# Patient Record
Sex: MALE | Race: WHITE | NOT HISPANIC OR LATINO | Employment: OTHER | URBAN - METROPOLITAN AREA
[De-identification: names, ages, dates, MRNs, and addresses within clinical notes are randomized per-mention and may not be internally consistent; named-entity substitution may affect disease eponyms.]

---

## 2020-07-21 ENCOUNTER — OFFICE VISIT (OUTPATIENT)
Dept: MEDICAL GROUP | Facility: PHYSICIAN GROUP | Age: 65
End: 2020-07-21

## 2020-07-21 VITALS
BODY MASS INDEX: 20.18 KG/M2 | HEART RATE: 77 BPM | OXYGEN SATURATION: 97 % | DIASTOLIC BLOOD PRESSURE: 88 MMHG | TEMPERATURE: 98.2 F | HEIGHT: 72 IN | WEIGHT: 149 LBS | SYSTOLIC BLOOD PRESSURE: 128 MMHG | RESPIRATION RATE: 14 BRPM

## 2020-07-21 DIAGNOSIS — Z12.5 SPECIAL SCREENING FOR MALIGNANT NEOPLASM OF PROSTATE: ICD-10-CM

## 2020-07-21 DIAGNOSIS — Z00.00 GENERAL MEDICAL EXAM: ICD-10-CM

## 2020-07-21 DIAGNOSIS — R56.9 SEIZURE (HCC): ICD-10-CM

## 2020-07-21 DIAGNOSIS — Z72.0 TOBACCO USE: ICD-10-CM

## 2020-07-21 PROCEDURE — 99203 OFFICE O/P NEW LOW 30 MIN: CPT | Performed by: PHYSICIAN ASSISTANT

## 2020-07-21 RX ORDER — PHENYTOIN SODIUM 100 MG/1
CAPSULE, EXTENDED RELEASE ORAL
COMMUNITY
Start: 2020-05-21 | End: 2020-10-20

## 2020-07-21 SDOH — HEALTH STABILITY: MENTAL HEALTH: HOW OFTEN DO YOU HAVE A DRINK CONTAINING ALCOHOL?: NEVER

## 2020-07-21 ASSESSMENT — PATIENT HEALTH QUESTIONNAIRE - PHQ9: CLINICAL INTERPRETATION OF PHQ2 SCORE: 0

## 2020-07-21 NOTE — ASSESSMENT & PLAN NOTE
Smoking 30 years, about 4 cigs per day, he's cutting down slowly. Going to go down to 2 cigs per day then quit. He's motivated to do this.

## 2020-07-21 NOTE — PROGRESS NOTES
CC:    Chief Complaint   Patient presents with   • Establish Care        HISTORY OF THE PRESENT ILLNESS: Patient is a 64 y.o. male. This pleasant patient is here today discuss seizure diagnosis    Health Maintenance: Completed  Colonoscopy due- never had one, will get after he gets medicare.   Dtap needs to be updated, but cash pay. Same with Shingrix.   Hep C screen- due in future, cash pay on labs.     Seizure (HCC)  Diagnosed 3 years ago, had seizure. Was being followed by [facility] neurology before. Neurologist left recently. Needs new Neurologist. Last saw her 6 months ago. Dilantin level has been checked in > 1 year. Needs to get records. Per patient he had MRI normal, EEG normal. Patient thinks his poor diet was the cause of his previous seizure. He was eating once a day, very poor diet. Now eating 3 meals a day feeling much better.     Tobacco use  Smoking 30 years, about 4 cigs per day, he's cutting down slowly. Going to go down to 2 cigs per day then quit. He's motivated to do this.     Allergies: Patient has no known allergies.    Current Outpatient Medications Ordered in Epic   Medication Sig Dispense Refill   • phenytoin ER (DILANTIN) 100 MG Cap Take  by mouth. Take 2 capsules by mouth two times a day       No current [facility]-ordered facility-administered medications on file.        Past Medical History:   Diagnosis Date   • Seizure (HCC)        Past Surgical History:   Procedure Laterality Date   • ELBOWPLASTY Right 1987       Social History     Tobacco Use   • Smoking status: Current Every Day Smoker     Packs/day: 0.25     Years: 30.00     Pack years: 7.50     Types: Cigarettes   • Smokeless tobacco: Never Used   • Tobacco comment: cutting back slowly.    Substance Use Topics   • Alcohol use: Not Currently     Frequency: Never   • Drug use: Not Currently       Social History     Social History Narrative   • Not on file       History reviewed. No pertinent family history.    ROS:   Constitutional: Patient  denies any fever, chills, night sweats, weight loss/gain, fatigue, or malaise.   Eyes: Patient denies any photophobia, eye pain, diplopia, discharge, dryness, excessive tearing, redness, or VA changes.   ENT: Patient denies any runny nose, hoarseness, sore throat, or dysphagia.   Resp: Patient denies cough, wheezing, or shortness of breath.   CV: Patient denies any chest pain, claudication, cyanosis, palpitations, or edema.   GI: Patient denies any constipation, nausea, vomiting, diarrhea, bloating, abdominal pain, or dyspepsia.   MSK: Patient denies any joint pain, cramps, swelling, weakness, stiffness, or tremor  Skin: Patient denies any color changes, skin changes, lesions, ulcerations, wounds, pruritus, hair or nail changes.   Neuro: Patient denies any headache, numbness or tingling  Psych: Patient denies any suicidal or homicidal ideation, depression or anxiety.       Exam: /88   Pulse 77   Temp 36.8 °C (98.2 °F) (Temporal)   Resp 14   Ht 1.829 m (6')   Wt 67.6 kg (149 lb)   SpO2 97%  Body mass index is 20.21 kg/m².    GENERAL: No acute distress, appropriately dressed. Well developed female.   HENT: Atraumatic, normocephalic, EAC's clear without impaction. TM's pearly gray and translucent.   EYES: Extraocular movements intact, pupils equal and reactive to light  NECK: Supple, FROM. No thyromegaly appreciated. No lymphadenopathy (submandibular, anterior/posterior cervical, and supraclavicular lymph nodes palpated) or masses. No bruits appreciated.   CHEST: No deformities, Equal chest expansion  HEART: Regular rate and rhythm, no murmur  RESP: Clear to auscultation bilaterally without wheezes, rales or rhonchi.   ABD: Soft, Nontender, Non-Distended  Extremities: No Clubbing, Cyanosis, or Edema  Skin: Warm/dry, no rashes.   Neuro: A/O x 4, Motor/sensory grossly intact (Due to COVID-19 did not have patient remove mask to perform cranial nerve check.)   Psych: Normal behavior, normal  affect      Assessment/Plan:  1. General medical exam  - DILANTIN; Future  - CBC WITH DIFFERENTIAL; Future  - Comp Metabolic Panel; Future  Patient cash pay for labs.  Instructed him to go to [facility] cash pay labs.  Her cash clinical.  Will need a Dilantin level has not been checked in over a year.  2. Seizure (HCC)  - REFERRAL TO NEUROLOGY  Check Dilantin level for patient.  Has not been checked over a year.  Referral to neurology.  May need to defer DMV form to neurology after further assessment.  Requesting records from [facility] neurology as well.  He has not had a seizure in 3 years.  Has been compliant with Dilantin in that timeframe.  3. Special screening for malignant neoplasm of prostate  Due for PSA level, patient is cash pay.  Patient would like to get this lab at a later date, when he has insurance. No family history of prostate cancer.  4. Tobacco use  Decreasing use.  Smoking 4 cigarettes a day.  He is going to cut back to 2 and then to none.  He is motivated to quit.  Other orders  - phenytoin ER (DILANTIN) 100 MG Cap; Take  by mouth. Take 2 capsules by mouth two times a day       Follow-up: Return in about 4 weeks (around 8/18/2020).    Please note that this dictation was created using voice recognition software. I have made every reasonable attempt to correct obvious errors, but I expect that there are errors of grammar and possibly content that I did not discover before finalizing the note.

## 2020-07-21 NOTE — ASSESSMENT & PLAN NOTE
Diagnosed 3 years ago, had seizure. Was being followed by [facility] neurology before. Neurologist left recently. Needs new Neurologist. Last saw her 6 months ago. Dilantin level has been checked in > 1 year. Needs to get records. Per patient he had MRI normal, EEG normal. Patient thinks his poor diet was the cause of his previous seizure. He was eating once a day, very poor diet. Now eating 3 meals a day feeling much better.

## 2020-07-27 ENCOUNTER — TELEPHONE (OUTPATIENT)
Dept: MEDICAL GROUP | Facility: PHYSICIAN GROUP | Age: 65
End: 2020-07-27

## 2020-07-27 NOTE — TELEPHONE ENCOUNTER
1st attempt: Left message on recorder to please call back about this MSG. [phone]    Please transfer to Yajaira for further discussion.

## 2020-07-29 NOTE — TELEPHONE ENCOUNTER
Pedro walked into clinic.    Pedro needs to have DMV paper work filled out by a Neurologist.    Neurologist referral has been placed.    DMV paper have been given back to Pedro.

## 2020-08-21 NOTE — PROGRESS NOTES
"Chief Complaint   Patient presents with   • New Patient     seizure       Problem List Items Addressed This Visit     Seizure (HCC)    Relevant Medications    levETIRAcetam (KEPPRA) 500 MG Tab    Other Relevant Orders    REFERRAL TO NEURODIAGNOSTICS (EEG,EP,EMG/NCS/DBS) Modality Requested: EEG (routine)      Other Visit Diagnoses     Vitamin D deficiency        Relevant Orders    VITAMIN D,25 HYDROXY          History of present illness:  Pedro Piper 65 y.o. male presents today for seizure evaluation. He was seeing Dr. Mcnulty before but she left [facility].     He reports he started having head bobbing and he passed out and fell off a chair in 2016. He was seen in the hospital and was told that he might have had a seizure. He f/u with Dr. Mcnulty and was started on Dilantin. A year later he had a similar spell but mild per pt. he describes this as having body tremors and passing out. He denies post-ictal confusion or disorientation. No tongue biting or incontinence. He believes that his spells are related to him not taking care of himself as he was eating \"crap\" and missing meals. He has been taking care of himself now and hasn't had any more spells. No auras or triggers. He is currently on Dilantin 100mg, 2 caps BID. No side effects. Compliant. He states he had EEG and MRI brain done. He was told they were normal. He had lab work done in July.    No family hx of epilepsy. No TBI hx.    Not drinking alcohol. Smokes 1/2 pack every 4 days. Occasional marijuana use.     Mood is good     Not taking vit D.     He is here to have his 's license renewed.         Past medical history:   Past Medical History:   Diagnosis Date   • Seizure (HCC)        Past surgical history:   Past Surgical History:   Procedure Laterality Date   • ELBOWPLASTY Right 1987       Family history:   History reviewed. No pertinent family history.    Social history:   Social History     Socioeconomic History   • Marital status: Single     " Spouse name: Not on file   • Number of children: Not on file   • Years of education: Not on file   • Highest education level: Not on file   Occupational History   • Not on file   Social Needs   • Financial resource strain: Not on file   • Food insecurity     Worry: Not on file     Inability: Not on file   • Transportation needs     Medical: Not on file     Non-medical: Not on file   Tobacco Use   • Smoking status: Current Every Day Smoker     Packs/day: 0.25     Years: 30.00     Pack years: 7.50     Types: Cigarettes   • Smokeless tobacco: Never Used   • Tobacco comment: cutting back slowly.    Substance and Sexual Activity   • Alcohol use: Not Currently     Frequency: Never   • Drug use: Not Currently   • Sexual activity: Not on file   Lifestyle   • Physical activity     Days per week: Not on file     Minutes per session: Not on file   • Stress: Not on file   Relationships   • Social connections     Talks on phone: Not on file     Gets together: Not on file     Attends Hinduism service: Not on file     Active member of club or organization: Not on file     Attends meetings of clubs or organizations: Not on file     Relationship status: Not on file   • Intimate partner violence     Fear of current or ex partner: Not on file     Emotionally abused: Not on file     Physically abused: Not on file     Forced sexual activity: Not on file   Other Topics Concern   • Not on file   Social History Narrative   • Not on file       Current medications:   Current Outpatient Medications   Medication   • phenytoin ER (DILANTIN) 100 MG Cap     No current facility-administered medications for this visit.        Medication Allergy:  No Known Allergies      Review of systems:     General: Denies fevers or chills, or nightsweats, or generalized fatigue.    Head: Denies headaches or dizziness or lightheadedness  EENT: Denies vision changes, vision loss or pain, nasal secretion, nasal bleeding, difficulty swallowing, hearing loss,  tinnitus, vertigo, ear pain  Respiratory: Denies shortness of breath, cough, sputum, or wheezing  Cardiac: Denies chest pain, palpitations, edema or syncope  Gastrointestinal: Denies nausea, vomiting, no abdominal pain or change in bowel habits, no melena or hematochezia  Urinary: Denies dysuria, frequency, hesitancy, or incontinence.  Dermatologic:  Denies new rash  Musculoskeletal: Denies muscle pain or swelling, no atrophy, no neck and back pain or stiffness.   Neurologic: Denies facial droopiness, muscle weakness (focal or generalized), paresthesias, ataxia, change in speech or language, memory loss, abnormal movements, seizures, loss of consciousness, or episodes of confusion.   Psychiatric: Denies anxiety, depression, mood swings, suicidal or homicidal thoughts       Physical examination:   Vitals:    08/28/20 0756   BP: 132/78   BP Location: Right arm   Patient Position: Sitting   BP Cuff Size: Adult   Pulse: 78   Resp: 18   Temp: 36.2 °C (97.2 °F)   TempSrc: Temporal   SpO2: 98%   Weight: 67.6 kg (149 lb 0.5 oz)   Height: 1.829 m (6')     General: Patient in no acute distress, pleasant and cooperative.  HEENT: Normocephalic, no signs of acute trauma.   moist conjunctivae. Nares are patent. Oropharynx clear without lesions and normal  hard and soft palates.   Neck: Supple. There is normal range of motion.   Resp: clear to auscultation bilaterally. No wheezes or crackles.   CV: RRR, no murmurs.   Abdomen: normoactive bowel sounds, soft, non distended or tender.   Skin: no signs of acute rashes or trauma.   Musculoskeletal: joints exhibit full range of motion, without any pain to palpation. There are no signs of joint or muscle swelling. There is no tenderness to deep palpation of muscles.   Psychiatric: No hallucinatory behavior. No symptoms of depression or suicidal ideation. Mood and affect appear normal on exam.     NEUROLOGICAL EXAM:   Mental status, orientation: Awake, alert and fully oriented.   Speech  and language: speech is clear and fluent. The patient is able to name, repeat and comprehend.   Memory: There is intact recollection of recent and remote events.   Cranial nerve exam:   CN I: Not examined   CN II: PERRL.   CN III, IV, VI: EOMI; no nystagmus   CN V: Facial sensation intact bilaterally   CN VII: face symmetric   CN VIII: hearing intact to finger rub bilaterally   CN IX, X: palate elevates symmetrically   CN XI: Symmetric shoulder shrug  CN XII: tongue midline. No signs of tongue biting or fasciculations   Motor exam: Strength is 5/5 in all extremities. Tone is normal. No abnormal movements were seen on exam.   Sensory exam reveals normal sense of light touch in all extremities.   Deep tendon reflexes:  2+ throughout. Plantar responses are flexor. There is no clonus.   Coordination: shows a normal finger-nose-finger. Normal rapidly alternating movements.   Gait: The patient was able to get up from seated position on first attempt without requiring assistance. Found to be steady when walking. Movements were fluid with normal arm swing. The patient was able to turn without difficulties or tendency to fall. Romberg exam unremarkable      ANCILLARY DATA REVIEWED:       Lab Data Review:  Reviewed in chart.     Records reviewed:   Reviewed in chart.    Imaging:   n/a    EEG:  n/a        ASSESSMENT AND PLAN:    1. Seizure (HCC)  - levETIRAcetam (KEPPRA) 500 MG Tab; Take 1 Tab by mouth 2 times a day.  Dispense: 60 Tab; Refill: 11  - REFERRAL TO NEURODIAGNOSTICS (EEG,EP,EMG/NCS/DBS) Modality Requested: EEG (routine)    2. Vitamin D deficiency  - VITAMIN D,25 HYDROXY; Future    3. Screening for depression    4. Encounter for examination for driving license    5. Tobacco use    6. 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 Pt had initial episode of head bobbing then passing out in 2016 then a year later, he had a similar spell. No post-ictal  confusion or disorientation. No tongue biting or incontinence. He was put on dilantin 100mg, 2 tabs BID by prior neurologist. No records to review. Will need to obtain results of EEG and MRI brain. He attributes his spells to bad diet as he was eating junk foods and not eating regularly then. No auras or triggers to his spells that he knows off.     No family hx of epilepsy. No TBI hx     No alcohol use.  Smokes cigarettes.  He uses marijuana occasionally    Mood is good. No suicidal or homicidal thoughts.  Past ASM's: none    Current ASM's: Dilantin (weaning off every month), Keppra 500mg BID.      Plan:  - Pt is self pay right now but will have insurance next year. He wants a head start of things and is willing to do routine EEG. Wants to do longer EEG next year.     -PT wants to continue to take ASM. Discussed side effects of chronic dilantin use including dental problems, balance issues related to cerebellar atrophy etc. Pt wants to switch this and he will think about challenging the diagnosis in a couple of years if his work-up is unremarkable. Will switch him to keppra. Aware of side effects including dizziness, drowsiness, fatigue, mood changes.  Verbal and written instructions given:    Dilantin: Take 1 cap in am and 2 caps at night for 1 month, then take 1 cap twice daily for 1 month, then take 1 cap at night for another month then discontinue.     Keppra: Start taking 1 tab twice a day.     -Will obtain records from [facility]. Will wait for MRI brain for now.     - Discussed avoidance of spell/sz triggers: alcohol, sleep deprivation, and stress.    - Discussed Vit D supplementation. Recommended taking 2000-5000u daily.    - Discussed driving restrictions. Okay to drive but aware to stop driving immediately if a spell occurs and report to us.  DMV paperwork given.     -Labs to be checked for next appointment: vit D            FOLLOW-UP:   Return in about 4 weeks (around 9/25/2020), or or after  EEG.      EDUCATION AND COUNSELING:  -Education was provided to the patient and/or family regarding diagnosis and prognosis. The chronic and unpredictable nature of the condition were discussed. There is increased risk for additional events, which may carry potential for significant injuries and death. Discussed frequent seizure triggers: sleep deprivation, medication non-compliance, use of illegal drugs/alcohol, stress, and others.   -We reviewed in detail the current antiepileptic regimen. Potential side effects of antiepileptics were discussed at length, including but no limited to: hypersensitivity reactions (rash and others, some of which can be fatal), visual field changes (some of which may be irreversible), glaucoma, diplopia, kidney stones, osteopenia/osteoporosis/bone fractures, hyperthermia/anhydrosis, hyponatremia, tremors/abnormal movements, ataxia, dizziness, fatigue, increased risk for falls, risk for cardiac arrhythmias/syncope, gastrointestinal side effects(hepatitis, pancreatitis, gastritis, ulcers), gingival hypertrophy/bleeding, drowsiness, sedation, anxiety/nervousness, increased risk for suicide, increased risk for depression, and psychosis.   -We also reviewed drug-drug interactions and their potential effect on seizure control and medication side effects.    -Recommend chronic vitamin D supplementation and regular exercise (if not contraindicated).   -Patient/family educated on risk for SUDEP (Sudden Death in Epilepsy). Counseling was provided on the importance of strict medication and follow up compliance. The patient/family understand the risks associated with non-adherence with the medical plan as outlined, including but not limited to an increased risk for breakthrough seizures, which may contribute to injuries, disability, status epilepticus, and even death.   -Counseling was also provided on potential effects of alcohol and other drugs, which may lower seizure threshold and/or affect  the metabolism of antiepileptic drugs. We recommend avoidance of alcohol and illegal drugs.  -Avoid sleep deprivation.   -We extensively discussed the aspects related to safety in drivers who suffer from epilepsy. The patient is encourage to report to the Division of Motor Vehicles of any condition and/or spells related to confusion, disorientation, and/or loss of awareness and/or loss of consciousness; as these may pose a safety issue if they occur while operating a motor vehicle. The patient and/or family are ultimately responsible for exercising caution and abiding to regulations in place.   -Other seizure precautions were discussed at length, including no diving, no skydiving, no climbing or exposure to unprotected heights, no unsupervised swimming, no Jacuzzi or bathing in bathtubs or deep bodies of water. The patient/family have been advised about risks for operating any machinery while suffering from seizures / syncope / epilepsy and/or while taking antiepileptic drugs.   -The patient understands and agrees that due to the complexity of his/her diagnosis, results of any testing and further recommendations will typically be discussed/made during a face to face encounter in my office. The patient and/or family further understands it is their responsibility to keep proper follow up.     Patient/family agree with plan, as outlined.         Latrice Dorantes, MSN, APRN, FNP-C  [facility]  Office: [phone]  Fax: [phone]

## 2020-08-28 ENCOUNTER — OFFICE VISIT (OUTPATIENT)
Dept: NEUROLOGY | Facility: MEDICAL CENTER | Age: 65
End: 2020-08-28

## 2020-08-28 VITALS
HEART RATE: 78 BPM | HEIGHT: 72 IN | RESPIRATION RATE: 18 BRPM | BODY MASS INDEX: 20.19 KG/M2 | SYSTOLIC BLOOD PRESSURE: 132 MMHG | OXYGEN SATURATION: 98 % | WEIGHT: 149.03 LBS | DIASTOLIC BLOOD PRESSURE: 78 MMHG | TEMPERATURE: 97.2 F

## 2020-08-28 DIAGNOSIS — E55.9 VITAMIN D DEFICIENCY: ICD-10-CM

## 2020-08-28 DIAGNOSIS — Z72.0 TOBACCO USE: ICD-10-CM

## 2020-08-28 DIAGNOSIS — R56.9 SEIZURE (HCC): ICD-10-CM

## 2020-08-28 DIAGNOSIS — Z71.6 ENCOUNTER FOR SMOKING CESSATION COUNSELING: ICD-10-CM

## 2020-08-28 DIAGNOSIS — Z02.4 ENCOUNTER FOR EXAMINATION FOR DRIVING LICENSE: ICD-10-CM

## 2020-08-28 DIAGNOSIS — Z13.31 SCREENING FOR DEPRESSION: ICD-10-CM

## 2020-08-28 PROCEDURE — 99205 OFFICE O/P NEW HI 60 MIN: CPT | Performed by: NURSE PRACTITIONER

## 2020-08-28 RX ORDER — LEVETIRACETAM 500 MG/1
500 TABLET ORAL 2 TIMES DAILY
Qty: 60 TAB | Refills: 11 | Status: SHIPPED | OUTPATIENT
Start: 2020-08-28 | End: 2021-08-06 | Stop reason: SDUPTHER

## 2020-08-28 RX ORDER — M-VIT,TX,IRON,MINS/CALC/FOLIC 27MG-0.4MG
1 TABLET ORAL DAILY
COMMUNITY

## 2020-08-28 NOTE — PATIENT INSTRUCTIONS
Dilantin: Take 1 cap in am and 2 caps at night for 1 month, then take 1 cap twice daily for 1 month, then take 1 cap at night for another month then discontinue.     Keppra: Start taking 1 tab twice a day.

## 2020-09-24 ENCOUNTER — TELEPHONE (OUTPATIENT)
Dept: NEUROLOGY | Facility: MEDICAL CENTER | Age: 65
End: 2020-09-24

## 2020-09-24 NOTE — TELEPHONE ENCOUNTER
Pt stated he took a 500 mg keppra last night was fine, this morning took another about 30 mins later the room was spinning, no balance. He is completely off the dilantin as of yesterday.    Per cathy I told pt he can stop taking the keppra and see how things go as far as balance issues but if he still feels dizzy, significant  balance issues he may need to go to the er. Pt verbally understood. Cathy would like to se pt asap to discuss meds. I will schedule pt.

## 2020-10-15 NOTE — PROGRESS NOTES
Chief Complaint   Patient presents with   • Follow-Up     Seizure (HCC)       Problem List Items Addressed This Visit     Seizure (HCC)    Tobacco use      Other Visit Diagnoses     Screening for depression        Encounter for smoking cessation counseling              Interim History:  Pedro Piper 65 y.o. male presents today for seizure f/u.     Pt had called last month c/o of side effects from the keppra but now he states he is doing well and he likes the keppra. He remains seizure free. He self discontinued the dilantin and now he thinks the side effects he was having was because he weaned himself off the dilantin too quickly. He denies any side effects on the keppra. Mood is good. No suicidal or homicidal thoughts. He forgot his lab work. He is taking vit D and is driving with no issues. He is weaning himself off smoking.          Past medical history:   Past Medical History:   Diagnosis Date   • Seizure (HCC)        Past surgical history:   Past Surgical History:   Procedure Laterality Date   • ELBOWPLASTY Right 1987       Family history:   History reviewed. No pertinent family history.    Social history:   Social History     Socioeconomic History   • Marital status: Single     Spouse name: Not on file   • Number of children: Not on file   • Years of education: Not on file   • Highest education level: Not on file   Occupational History   • Not on file   Social Needs   • Financial resource strain: Not on file   • Food insecurity     Worry: Not on file     Inability: Not on file   • Transportation needs     Medical: Not on file     Non-medical: Not on file   Tobacco Use   • Smoking status: Current Every Day Smoker     Packs/day: 0.25     Years: 30.00     Pack years: 7.50     Types: Cigarettes   • Smokeless tobacco: Never Used   • Tobacco comment: cutting back slowly.    Substance and Sexual Activity   • Alcohol use: Never     Frequency: Never   • Drug use: Yes     Types: Marijuana     Comment: occ   •  Sexual activity: Not on file   Lifestyle   • Physical activity     Days per week: Not on file     Minutes per session: Not on file   • Stress: Not on file   Relationships   • Social connections     Talks on phone: Not on file     Gets together: Not on file     Attends Nondenominational service: Not on file     Active member of club or organization: Not on file     Attends meetings of clubs or organizations: Not on file     Relationship status: Not on file   • Intimate partner violence     Fear of current or ex partner: Not on file     Emotionally abused: Not on file     Physically abused: Not on file     Forced sexual activity: Not on file   Other Topics Concern   • Not on file   Social History Narrative   • Not on file       Current medications:   Current Outpatient Medications   Medication   • therapeutic multivitamin-minerals (THERAGRAN-M) Tab   • levETIRAcetam (KEPPRA) 500 MG Tab   • phenytoin ER (DILANTIN) 100 MG Cap     No current facility-administered medications for this visit.        Medication Allergy:  No Known Allergies      Review of systems:     General: Denies fevers or chills, or nightsweats, or generalized fatigue.    Head: Denies headaches or dizziness or lightheadedness  EENT: Denies vision changes, vision loss or pain, nasal secretion, nasal bleeding, difficulty swallowing, hearing loss, tinnitus, vertigo, ear pain  Musculoskeletal: Denies muscle pain or swelling, no atrophy, no neck and back pain or stiffness.   Neurologic: Denies facial droopiness, muscle weakness (focal or generalized), paresthesias, ataxia, change in speech or language, memory loss, abnormal movements, seizures, loss of consciousness, or episodes of confusion.   Psychiatric: Denies anxiety, depression, mood swings, suicidal or homicidal thoughts       Physical examination:   Vitals:    10/20/20 1423   BP: 130/78   BP Location: Right arm   Patient Position: Sitting   BP Cuff Size: Adult   Pulse: 78   Resp: 18   Temp: 35.9 °C (96.7 °F)    TempSrc: Temporal   SpO2: 96%   Weight: 66.5 kg (146 lb 9.7 oz)   Height: 1.829 m (6')     General: Patient in no acute distress, pleasant and cooperative.  HEENT: Normocephalic, no signs of acute trauma.   Neck: Supple. There is normal range of motion.   Resp: clear to auscultation bilaterally. No wheezes or crackles.   CV: RRR, no murmurs.   Musculoskeletal: joints exhibit full range of motion  Psychiatric: No hallucinatory behavior. No symptoms of depression or suicidal ideation. Mood and affect appear normal on exam.     NEUROLOGICAL EXAM:   Mental status, orientation: Awake, alert and fully oriented.   Speech and language: speech is clear and fluent. The patient is able to name, repeat and comprehend.   Memory: There is intact recollection of recent and remote events.   Motor exam: Strength is 5/5 in all extremities. Tone is normal. No abnormal movements were seen on exam.   Sensory exam reveals normal sense of light touch in all extremities.   Gait: The patient was able to get up from seated position on first attempt without requiring assistance. Found to be steady when walking. Movements were fluid with normal arm swing. The patient was able to turn without difficulties or tendency to fall.       ANCILLARY DATA REVIEWED:       Lab Data Review:  Reviewed in chart. CBC, CMP    Records reviewed:   Reviewed in chart.    Imaging:   CT head 1/29/2018  Normal CT head w/o contrast    MRI brain w/o 11/26/2016  No acute intracranial pathology is seen to explain pt's symptoms  Mild chronic periventricular white matter microischemic changes  Tiny hemosiderin deposit in the left posterior temporal lobe    EEG:  n/a        ASSESSMENT AND PLAN:    1. Seizure (HCC)    2. Screening for depression    3. Tobacco use    4. 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 Pt had initial episode of head bobbing then passing out in 2016 then a year later, he had a  similar spell. No post-ictal confusion or disorientation. No tongue biting or incontinence. He was put on dilantin 100mg, 2 tabs BID by prior neurologist. No records to review. Will need to obtain results of EEG and MRI brain. He attributes his spells to bad diet as he was eating junk foods and not eating regularly then. No auras or triggers to his spells that he knows off. His CT head was unremarkable. His MRI brain w/o showed tiny hemosiderin deposit in the left posterior temporal lobe - no image to review. Pt is open to repeating imaging with contrast once he has insurance next year. He weaned himself off the dilantin too quickly but he is doing well on keppra now. No breakthrough seizure. No side effects. compliant     No family hx of epilepsy. No TBI hx      No alcohol use.  Smokes cigarettes.  He uses marijuana occasionally     Mood is good. No suicidal or homicidal thoughts.    Past ASM's: dilantin     Current ASM's: Keppra 500mg BID.        Plan:  - Pt is self pay right now but will have insurance next year. He wants a head start of things and is willing to do routine EEG. Wants to do longer EEG next year.      - Discussed avoidance of spell/sz triggers: alcohol, sleep deprivation, and stress.     - Discussed Vit D supplementation. Recommended taking 2000-5000u daily.     - Discussed driving restrictions. Okay to drive but aware to stop driving immediately if a spell occurs and report to us.     -Labs to be checked for next appointment: vit D -pt forgot this        FOLLOW-UP:   Return in about 6 months (around 4/20/2021).      EDUCATION AND COUNSELING:  -Education was provided to the patient and/or family regarding diagnosis and prognosis. The chronic and unpredictable nature of the condition were discussed. There is increased risk for additional events, which may carry potential for significant injuries and death. Discussed frequent seizure triggers: sleep deprivation, medication non-compliance, use of  illegal drugs/alcohol, stress, and others.   -We reviewed in detail the current antiepileptic regimen. Potential side effects of antiepileptics were discussed at length, including but no limited to: hypersensitivity reactions (rash and others, some of which can be fatal), visual field changes (some of which may be irreversible), glaucoma, diplopia, kidney stones, osteopenia/osteoporosis/bone fractures, hyperthermia/anhydrosis, hyponatremia, tremors/abnormal movements, ataxia, dizziness, fatigue, increased risk for falls, risk for cardiac arrhythmias/syncope, gastrointestinal side effects(hepatitis, pancreatitis, gastritis, ulcers), gingival hypertrophy/bleeding, drowsiness, sedation, anxiety/nervousness, increased risk for suicide, increased risk for depression, and psychosis.   -We also reviewed drug-drug interactions and their potential effect on seizure control and medication side effects.    -Recommend chronic vitamin D supplementation and regular exercise (if not contraindicated).   -Patient/family educated on risk for SUDEP (Sudden Death in Epilepsy). Counseling was provided on the importance of strict medication and follow up compliance. The patient/family understand the risks associated with non-adherence with the medical plan as outlined, including but not limited to an increased risk for breakthrough seizures, which may contribute to injuries, disability, status epilepticus, and even death.   -Counseling was also provided on potential effects of alcohol and other drugs, which may lower seizure threshold and/or affect the metabolism of antiepileptic drugs. We recommend avoidance of alcohol and illegal drugs.  -Avoid sleep deprivation.   -We extensively discussed the aspects related to safety in drivers who suffer from epilepsy. The patient is encourage to report to the Division of Motor Vehicles of any condition and/or spells related to confusion, disorientation, and/or loss of awareness and/or loss of  consciousness; as these may pose a safety issue if they occur while operating a motor vehicle. The patient and/or family are ultimately responsible for exercising caution and abiding to regulations in place.   -Other seizure precautions were discussed at length, including no diving, no skydiving, no climbing or exposure to unprotected heights, no unsupervised swimming, no Jacuzzi or bathing in bathtubs or deep bodies of water. The patient/family have been advised about risks for operating any machinery while suffering from seizures / syncope / epilepsy and/or while taking antiepileptic drugs.   -The patient understands and agrees that due to the complexity of his/her diagnosis, results of any testing and further recommendations will typically be discussed/made during a face to face encounter in my office. The patient and/or family further understands it is their responsibility to keep proper follow up.     Patient/family agree with plan, as outlined.         Latrice Dorantes, MSN, APRN, FNP-C  [facility]  Office: [phone]  Fax: [phone]

## 2020-10-20 ENCOUNTER — OFFICE VISIT (OUTPATIENT)
Dept: NEUROLOGY | Facility: MEDICAL CENTER | Age: 65
End: 2020-10-20

## 2020-10-20 VITALS
RESPIRATION RATE: 18 BRPM | HEIGHT: 72 IN | TEMPERATURE: 96.7 F | WEIGHT: 146.61 LBS | DIASTOLIC BLOOD PRESSURE: 78 MMHG | BODY MASS INDEX: 19.86 KG/M2 | HEART RATE: 78 BPM | OXYGEN SATURATION: 96 % | SYSTOLIC BLOOD PRESSURE: 130 MMHG

## 2020-10-20 DIAGNOSIS — R56.9 SEIZURE (HCC): ICD-10-CM

## 2020-10-20 DIAGNOSIS — Z72.0 TOBACCO USE: ICD-10-CM

## 2020-10-20 DIAGNOSIS — Z13.31 SCREENING FOR DEPRESSION: ICD-10-CM

## 2020-10-20 DIAGNOSIS — Z71.6 ENCOUNTER FOR SMOKING CESSATION COUNSELING: ICD-10-CM

## 2020-10-20 PROCEDURE — 99214 OFFICE O/P EST MOD 30 MIN: CPT | Performed by: NURSE PRACTITIONER

## 2020-12-09 ENCOUNTER — PRE-ADMISSION TESTING (OUTPATIENT)
Dept: ADMISSIONS | Facility: MEDICAL CENTER | Age: 65
End: 2020-12-09
Attending: ORTHOPAEDIC SURGERY

## 2020-12-09 DIAGNOSIS — Z01.812 PRE-OPERATIVE LABORATORY EXAMINATION: ICD-10-CM

## 2020-12-09 LAB
COVID ORDER STATUS COVID19: NORMAL
SARS-COV+SARS-COV-2 AG RESP QL IA.RAPID: DETECTED
SPECIMEN SOURCE: ABNORMAL

## 2020-12-09 PROCEDURE — 87426 SARSCOV CORONAVIRUS AG IA: CPT

## 2020-12-09 PROCEDURE — C9803 HOPD COVID-19 SPEC COLLECT: HCPCS

## 2020-12-09 NOTE — OR NURSING
Spoke with Sana from preadmit regarding the patient's positive covid result. Sana asked what to tell patient as far as checking in. At the same time patient called pre-op and I informed Sana I would speak with the patient and arrange check in.     Spoke with patient on the phone, instructed to check in at 1000. Given the number to pre-op and told to call when he arrives at ground floor in [facility]. Patient will then be brought immediately to the unit and taken to a room.    Will communicate with the OR regarding the patient's test results.

## 2020-12-09 NOTE — OR NURSING
Pt. With positive rapid covid test, notified Dr. Marquez's schedulers. Notified pre-op supervisor Sheridan who notified pt. And stated she would notify the OR. Called anesthesia and spoke to Carito

## 2020-12-10 ENCOUNTER — ANESTHESIA EVENT (OUTPATIENT)
Dept: SURGERY | Facility: MEDICAL CENTER | Age: 65
End: 2020-12-10

## 2020-12-10 ENCOUNTER — HOSPITAL ENCOUNTER (OUTPATIENT)
Facility: MEDICAL CENTER | Age: 65
End: 2020-12-10
Attending: ORTHOPAEDIC SURGERY | Admitting: ORTHOPAEDIC SURGERY

## 2020-12-10 ENCOUNTER — ANESTHESIA (OUTPATIENT)
Dept: SURGERY | Facility: MEDICAL CENTER | Age: 65
End: 2020-12-10

## 2020-12-10 ENCOUNTER — APPOINTMENT (OUTPATIENT)
Dept: RADIOLOGY | Facility: MEDICAL CENTER | Age: 65
End: 2020-12-10
Attending: ORTHOPAEDIC SURGERY

## 2020-12-10 VITALS
OXYGEN SATURATION: 95 % | TEMPERATURE: 96.9 F | HEIGHT: 72 IN | WEIGHT: 149.03 LBS | SYSTOLIC BLOOD PRESSURE: 160 MMHG | RESPIRATION RATE: 12 BRPM | DIASTOLIC BLOOD PRESSURE: 93 MMHG | HEART RATE: 87 BPM | BODY MASS INDEX: 20.19 KG/M2

## 2020-12-10 DIAGNOSIS — G89.18 POSTOPERATIVE PAIN: ICD-10-CM

## 2020-12-10 PROCEDURE — 700102 HCHG RX REV CODE 250 W/ 637 OVERRIDE(OP): Performed by: ANESTHESIOLOGY

## 2020-12-10 PROCEDURE — 160048 HCHG OR STATISTICAL LEVEL 1-5: Performed by: ORTHOPAEDIC SURGERY

## 2020-12-10 PROCEDURE — 160029 HCHG SURGERY MINUTES - 1ST 30 MINS LEVEL 4: Performed by: ORTHOPAEDIC SURGERY

## 2020-12-10 PROCEDURE — 160009 HCHG ANES TIME/MIN: Performed by: ORTHOPAEDIC SURGERY

## 2020-12-10 PROCEDURE — 160035 HCHG PACU - 1ST 60 MINS PHASE I: Performed by: ORTHOPAEDIC SURGERY

## 2020-12-10 PROCEDURE — 160002 HCHG RECOVERY MINUTES (STAT): Performed by: ORTHOPAEDIC SURGERY

## 2020-12-10 PROCEDURE — 502000 HCHG MISC OR IMPLANTS RC 0278: Performed by: ORTHOPAEDIC SURGERY

## 2020-12-10 PROCEDURE — 500562 HCHG FIBERWIRE: Performed by: ORTHOPAEDIC SURGERY

## 2020-12-10 PROCEDURE — 501838 HCHG SUTURE GENERAL: Performed by: ORTHOPAEDIC SURGERY

## 2020-12-10 PROCEDURE — 700111 HCHG RX REV CODE 636 W/ 250 OVERRIDE (IP): Performed by: ANESTHESIOLOGY

## 2020-12-10 PROCEDURE — 700101 HCHG RX REV CODE 250: Performed by: ANESTHESIOLOGY

## 2020-12-10 PROCEDURE — 73030 X-RAY EXAM OF SHOULDER: CPT | Mod: RT

## 2020-12-10 PROCEDURE — 160041 HCHG SURGERY MINUTES - EA ADDL 1 MIN LEVEL 4: Performed by: ORTHOPAEDIC SURGERY

## 2020-12-10 PROCEDURE — A9270 NON-COVERED ITEM OR SERVICE: HCPCS | Performed by: ORTHOPAEDIC SURGERY

## 2020-12-10 PROCEDURE — 500891 HCHG PACK, ORTHO MAJOR: Performed by: ORTHOPAEDIC SURGERY

## 2020-12-10 PROCEDURE — 700105 HCHG RX REV CODE 258: Performed by: ORTHOPAEDIC SURGERY

## 2020-12-10 PROCEDURE — 700101 HCHG RX REV CODE 250: Performed by: ORTHOPAEDIC SURGERY

## 2020-12-10 PROCEDURE — 160036 HCHG PACU - EA ADDL 30 MINS PHASE I: Performed by: ORTHOPAEDIC SURGERY

## 2020-12-10 PROCEDURE — A9270 NON-COVERED ITEM OR SERVICE: HCPCS | Performed by: ANESTHESIOLOGY

## 2020-12-10 DEVICE — IMPLANTABLE DEVICE: Type: IMPLANTABLE DEVICE | Site: SHOULDER | Status: FUNCTIONAL

## 2020-12-10 RX ORDER — OXYCODONE HCL 5 MG/5 ML
5 SOLUTION, ORAL ORAL
Status: COMPLETED | OUTPATIENT
Start: 2020-12-10 | End: 2020-12-10

## 2020-12-10 RX ORDER — CEFAZOLIN SODIUM 1 G/3ML
INJECTION, POWDER, FOR SOLUTION INTRAMUSCULAR; INTRAVENOUS PRN
Status: DISCONTINUED | OUTPATIENT
Start: 2020-12-10 | End: 2020-12-10 | Stop reason: SURG

## 2020-12-10 RX ORDER — CELECOXIB 200 MG/1
200 CAPSULE ORAL ONCE
Status: COMPLETED | OUTPATIENT
Start: 2020-12-10 | End: 2020-12-10

## 2020-12-10 RX ORDER — HYDROMORPHONE HYDROCHLORIDE 1 MG/ML
0.4 INJECTION, SOLUTION INTRAMUSCULAR; INTRAVENOUS; SUBCUTANEOUS
Status: DISCONTINUED | OUTPATIENT
Start: 2020-12-10 | End: 2020-12-11 | Stop reason: HOSPADM

## 2020-12-10 RX ORDER — HALOPERIDOL 5 MG/ML
1 INJECTION INTRAMUSCULAR
Status: DISCONTINUED | OUTPATIENT
Start: 2020-12-10 | End: 2020-12-11 | Stop reason: HOSPADM

## 2020-12-10 RX ORDER — SODIUM CHLORIDE, SODIUM LACTATE, POTASSIUM CHLORIDE, CALCIUM CHLORIDE 600; 310; 30; 20 MG/100ML; MG/100ML; MG/100ML; MG/100ML
INJECTION, SOLUTION INTRAVENOUS CONTINUOUS
Status: DISCONTINUED | OUTPATIENT
Start: 2020-12-10 | End: 2020-12-11 | Stop reason: HOSPADM

## 2020-12-10 RX ORDER — DEXAMETHASONE SODIUM PHOSPHATE 4 MG/ML
INJECTION, SOLUTION INTRA-ARTICULAR; INTRALESIONAL; INTRAMUSCULAR; INTRAVENOUS; SOFT TISSUE PRN
Status: DISCONTINUED | OUTPATIENT
Start: 2020-12-10 | End: 2020-12-10 | Stop reason: SURG

## 2020-12-10 RX ORDER — HYDROCODONE BITARTRATE AND ACETAMINOPHEN 7.5; 325 MG/1; MG/1
1 TABLET ORAL EVERY 6 HOURS PRN
Qty: 30 TAB | Refills: 0 | Status: SHIPPED | OUTPATIENT
Start: 2020-12-10 | End: 2020-12-24

## 2020-12-10 RX ORDER — ONDANSETRON 2 MG/ML
4 INJECTION INTRAMUSCULAR; INTRAVENOUS
Status: COMPLETED | OUTPATIENT
Start: 2020-12-10 | End: 2020-12-10

## 2020-12-10 RX ORDER — HYDROMORPHONE HYDROCHLORIDE 1 MG/ML
0.1 INJECTION, SOLUTION INTRAMUSCULAR; INTRAVENOUS; SUBCUTANEOUS
Status: DISCONTINUED | OUTPATIENT
Start: 2020-12-10 | End: 2020-12-11 | Stop reason: HOSPADM

## 2020-12-10 RX ORDER — MEPERIDINE HYDROCHLORIDE 25 MG/ML
12.5 INJECTION INTRAMUSCULAR; INTRAVENOUS; SUBCUTANEOUS
Status: DISCONTINUED | OUTPATIENT
Start: 2020-12-10 | End: 2020-12-11 | Stop reason: HOSPADM

## 2020-12-10 RX ORDER — OXYCODONE HCL 5 MG/5 ML
10 SOLUTION, ORAL ORAL
Status: COMPLETED | OUTPATIENT
Start: 2020-12-10 | End: 2020-12-10

## 2020-12-10 RX ORDER — ONDANSETRON 2 MG/ML
INJECTION INTRAMUSCULAR; INTRAVENOUS PRN
Status: DISCONTINUED | OUTPATIENT
Start: 2020-12-10 | End: 2020-12-10 | Stop reason: SURG

## 2020-12-10 RX ORDER — HYDROMORPHONE HYDROCHLORIDE 1 MG/ML
0.2 INJECTION, SOLUTION INTRAMUSCULAR; INTRAVENOUS; SUBCUTANEOUS
Status: DISCONTINUED | OUTPATIENT
Start: 2020-12-10 | End: 2020-12-11 | Stop reason: HOSPADM

## 2020-12-10 RX ORDER — MELOXICAM 7.5 MG/1
15 TABLET ORAL DAILY
Qty: 30 TAB | Refills: 1 | Status: SHIPPED | OUTPATIENT
Start: 2020-12-10 | End: 2021-11-05

## 2020-12-10 RX ORDER — ACETAMINOPHEN 500 MG
1000 TABLET ORAL ONCE
Status: COMPLETED | OUTPATIENT
Start: 2020-12-10 | End: 2020-12-10

## 2020-12-10 RX ORDER — ROCURONIUM BROMIDE 10 MG/ML
INJECTION, SOLUTION INTRAVENOUS PRN
Status: DISCONTINUED | OUTPATIENT
Start: 2020-12-10 | End: 2020-12-10 | Stop reason: SURG

## 2020-12-10 RX ORDER — HYDROMORPHONE HYDROCHLORIDE 2 MG/ML
INJECTION, SOLUTION INTRAMUSCULAR; INTRAVENOUS; SUBCUTANEOUS PRN
Status: DISCONTINUED | OUTPATIENT
Start: 2020-12-10 | End: 2020-12-10 | Stop reason: SURG

## 2020-12-10 RX ORDER — LIDOCAINE HYDROCHLORIDE 20 MG/ML
INJECTION, SOLUTION EPIDURAL; INFILTRATION; INTRACAUDAL; PERINEURAL PRN
Status: DISCONTINUED | OUTPATIENT
Start: 2020-12-10 | End: 2020-12-10 | Stop reason: SURG

## 2020-12-10 RX ADMIN — HYDROMORPHONE HYDROCHLORIDE 1 MG: 2 INJECTION, SOLUTION INTRAMUSCULAR; INTRAVENOUS; SUBCUTANEOUS at 16:22

## 2020-12-10 RX ADMIN — CELECOXIB 200 MG: 200 CAPSULE ORAL at 10:56

## 2020-12-10 RX ADMIN — CEFAZOLIN 2 G: 330 INJECTION, POWDER, FOR SOLUTION INTRAMUSCULAR; INTRAVENOUS at 13:57

## 2020-12-10 RX ADMIN — OXYCODONE HYDROCHLORIDE 10 MG: 5 SOLUTION ORAL at 16:59

## 2020-12-10 RX ADMIN — ROCURONIUM BROMIDE 10 MG: 10 INJECTION, SOLUTION INTRAVENOUS at 16:02

## 2020-12-10 RX ADMIN — SUGAMMADEX 200 MG: 100 INJECTION, SOLUTION INTRAVENOUS at 16:19

## 2020-12-10 RX ADMIN — FENTANYL CITRATE 50 MCG: 50 INJECTION, SOLUTION INTRAMUSCULAR; INTRAVENOUS at 17:43

## 2020-12-10 RX ADMIN — FENTANYL CITRATE 100 MCG: 50 INJECTION, SOLUTION INTRAMUSCULAR; INTRAVENOUS at 13:40

## 2020-12-10 RX ADMIN — ACETAMINOPHEN 1000 MG: 500 TABLET ORAL at 10:56

## 2020-12-10 RX ADMIN — SODIUM CHLORIDE, POTASSIUM CHLORIDE, SODIUM LACTATE AND CALCIUM CHLORIDE: 600; 310; 30; 20 INJECTION, SOLUTION INTRAVENOUS at 10:54

## 2020-12-10 RX ADMIN — ROCURONIUM BROMIDE 50 MG: 10 INJECTION, SOLUTION INTRAVENOUS at 13:40

## 2020-12-10 RX ADMIN — FENTANYL CITRATE 50 MCG: 50 INJECTION, SOLUTION INTRAMUSCULAR; INTRAVENOUS at 16:21

## 2020-12-10 RX ADMIN — LIDOCAINE HYDROCHLORIDE 0.5 ML: 10 INJECTION, SOLUTION EPIDURAL; INFILTRATION; INTRACAUDAL; PERINEURAL at 10:54

## 2020-12-10 RX ADMIN — POVIDONE IODINE 15 ML: 100 SOLUTION TOPICAL at 10:55

## 2020-12-10 RX ADMIN — ROCURONIUM BROMIDE 20 MG: 10 INJECTION, SOLUTION INTRAVENOUS at 14:48

## 2020-12-10 RX ADMIN — FENTANYL CITRATE 50 MCG: 50 INJECTION, SOLUTION INTRAMUSCULAR; INTRAVENOUS at 14:47

## 2020-12-10 RX ADMIN — ONDANSETRON 4 MG: 2 INJECTION INTRAMUSCULAR; INTRAVENOUS at 16:59

## 2020-12-10 RX ADMIN — FENTANYL CITRATE 50 MCG: 50 INJECTION, SOLUTION INTRAMUSCULAR; INTRAVENOUS at 17:24

## 2020-12-10 RX ADMIN — PROPOFOL 150 MG: 10 INJECTION, EMULSION INTRAVENOUS at 13:40

## 2020-12-10 RX ADMIN — DEXAMETHASONE SODIUM PHOSPHATE 8 MG: 4 INJECTION, SOLUTION INTRA-ARTICULAR; INTRALESIONAL; INTRAMUSCULAR; INTRAVENOUS; SOFT TISSUE at 13:46

## 2020-12-10 RX ADMIN — FENTANYL CITRATE 50 MCG: 50 INJECTION, SOLUTION INTRAMUSCULAR; INTRAVENOUS at 14:26

## 2020-12-10 RX ADMIN — ONDANSETRON 4 MG: 2 INJECTION INTRAMUSCULAR; INTRAVENOUS at 16:19

## 2020-12-10 RX ADMIN — LIDOCAINE HYDROCHLORIDE 60 MG: 20 INJECTION, SOLUTION EPIDURAL; INFILTRATION; INTRACAUDAL at 13:40

## 2020-12-10 ASSESSMENT — PAIN DESCRIPTION - PAIN TYPE
TYPE: SURGICAL PAIN

## 2020-12-10 ASSESSMENT — PAIN SCALES - GENERAL: PAIN_LEVEL: 0

## 2020-12-10 NOTE — ANESTHESIA PREPROCEDURE EVALUATION
Relevant Problems   NEURO   (+) Seizure (HCC)   right shoulder fracture/dislocation  Smoker  etoh  Covid + on rapid test prescreening, no sick contacts, no recent illnesses or symptoms, has been self isolating for over 2 weeks    Physical Exam    Airway   Mallampati: I  TM distance: >3 FB  Neck ROM: full       Cardiovascular - normal exam  Rhythm: regular  Rate: normal  (-) murmur     Dental   Comments: No loose, fairly poor         Pulmonary - normal exam  Breath sounds clear to auscultation     Abdominal    Neurological - normal exam               Anesthesia Plan    ASA 3 (seizure disorder)       Plan - general and peripheral nerve block     Peripheral nerve block will be post-op pain control  Airway plan will be ETT        Induction: intravenous    Postoperative Plan: Postoperative administration of opioids is intended.    Pertinent diagnostic labs and testing reviewed    Informed Consent:    Anesthetic plan and risks discussed with patient.    Use of blood products discussed with: patient whom consented to blood products.

## 2020-12-10 NOTE — H&P
Date of Service: 12/10/20    CC: Right shoulder dislocation    HPI: Pedro Piper is a 65 y.o. male who presents with complaints of pain to right shoulder.  This started a little over 2 weeks ago after he fell out of a bed onto his right side.  The pain is 4/10 and is described as sharp.  The pain is made worse by palpation of the area and made better by rest and immobilization.  He denies any other sources of pain.  Denies any numbness or tingling.  He has had swelling more distally near his hand but has no pain at that area.    PMH:   Past Medical History:   Diagnosis Date   • Seizure (HCC)        PSH:   Past Surgical History:   Procedure Laterality Date   • ELBOWPLASTY Right 1987       FH: History reviewed. No pertinent family history.    SH:   Social History     Socioeconomic History   • Marital status: Single     Spouse name: Not on file   • Number of children: Not on file   • Years of education: Not on file   • Highest education level: Not on file   Occupational History   • Not on file   Social Needs   • Financial resource strain: Not on file   • Food insecurity     Worry: Not on file     Inability: Not on file   • Transportation needs     Medical: Not on file     Non-medical: Not on file   Tobacco Use   • Smoking status: Current Every Day Smoker     Packs/day: 0.25     Years: 30.00     Pack years: 7.50     Types: Cigarettes   • Smokeless tobacco: Never Used   • Tobacco comment: cutting back slowly.    Substance and Sexual Activity   • Alcohol use: Never     Frequency: Never   • Drug use: Yes     Types: Marijuana, Inhaled     Comment: marijuana- last use 12/6/20   • Sexual activity: Not on file   Lifestyle   • Physical activity     Days per week: Not on file     Minutes per session: Not on file   • Stress: Not on file   Relationships   • Social connections     Talks on phone: Not on file     Gets together: Not on file     Attends Christian service: Not on file     Active member of club or organization:  Not on file     Attends meetings of clubs or organizations: Not on file     Relationship status: Not on file   • Intimate partner violence     Fear of current or ex partner: Not on file     Emotionally abused: Not on file     Physically abused: Not on file     Forced sexual activity: Not on file   Other Topics Concern   • Not on file   Social History Narrative   • Not on file       ROS: A 10 system review of systems was negative outside what was listed in the HPI    /103   Pulse 90   Temp 36.1 °C (96.9 °F) (Temporal)   Resp 16   Ht 1.829 m (6')   Wt 67.6 kg (149 lb 0.5 oz)   SpO2 97%     Physical Exam:  General: AAOx3, NAD  HEENT: normocephalic, atraumatic  Psych: Normal mood and affect  Neck: supple, no pain to motion  Chest/Pulmonary: breathing unlabored, no audible wheezing  Cardio: regular heart rate and rhythm  Neuro: sensation grossly intact to BUE and BLE, moving all four extremities  Skin: intact with no full thickness abrasions or lacerations  MSK: Right shoulder with anterior prominence.  No tenderness of the elbow forearm or wrist.  He has normal sensation in median radial ulnar and axillary nerve distributions.  Normal motor function of the hand and wrist.  Swelling to the forearm through the hand but no point tenderness.  Left upper extremity bilateral lower extremities are atraumatic and nontender to palpation range of motion.    Imaging and labs: X-rays and the CT scan of the right shoulder from outside facility show a greater tuberosity fracture and anterior glenohumeral dislocation    Assessment: Right shoulder dislocation with greater tuberosity fracture    We discussed the diagnosis and findings with the patient at length.  We reviewed possible non operative and operative interventions and the risks and benefits of these.  Recommend initial attempt at closed reduction and assess his stability.  If he remains unstable of the greater tuberosity fracture is poorly aligned he may need open  reduction internal fixation.  He had a chance to ask questions and all of these were answered to his satisfaction.        Plan:  N.p.o.  OR for closed versus open reduction of right shoulder  Covid precautions  Discharged home following procedure  Clinic follow-up  Sling for comfort

## 2020-12-10 NOTE — ANESTHESIA PROCEDURE NOTES
Airway    Date/Time: 12/10/2020 1:41 PM  Performed by: Sigifredo Burgos M.D.  Authorized by: Sigifredo Burgos M.D.     Location:  OR  Urgency:  Elective  Indications for Airway Management:  Anesthesia  Additional Indication: COVID-19 Active or PUI  COVID-19 Comments: I performed this patient's endotracheal intubation which required substantially increased work beyond that of the typical emergency endotracheal intubation based on the need to use COVID-19 precautions which required increased time and skill employed through use of personal protective equipment in preparing for and during the intubation as well as the additional time spent properly disposing of the equipment upon completion of the procedure.  Spontaneous Ventilation: absent    Sedation Level:  Deep  Preoxygenated: Yes    Patient Position:  Sniffing  Mask Difficulty Assessment:  0 - not attempted  Final Airway Type:  Endotracheal airway  Final Endotracheal Airway:  ETT  Cuffed: Yes    Technique Used for Successful ETT Placement:  Direct laryngoscopy  Devices/Methods Used in Placement:  Intubating stylet    Insertion Site:  Oral  Blade Type:  Rangel  Laryngoscope Blade/Videolaryngoscope Blade Size:  2  ETT Size (mm):  7.5  Measured from:  Teeth  ETT to Teeth (cm):  25  Placement Verified by: auscultation and capnometry    Cormack-Lehane Classification:  Grade IIa - partial view of glottis  Number of Attempts at Approach:  1

## 2020-12-11 NOTE — DISCHARGE INSTR - OTHER INFO
Keep the right arm in the sling immobilizer for the next 2 weeks.  You can come out of the sling to stretch the elbow with the arm at your side.  No lifting pushing or pulling with the right arm  Keep dressings in place for 3 days and then these can be left open to air if there is no further drainage.  Okay to shower starting on Sunday or Monday if the wound shows no drainage.  The arm would need to be kept at your side while showering.

## 2020-12-11 NOTE — OR NURSING
1637: Pt arrives to PACU iso room. Dressing to right shoulder is CDI. VSS. Spoke to pts father and updated him on pt status.    1700: pt c/o some pain to his right shoulder- medicated per MAR. Tolerating sips of water.     1740: DC instructions reviewed with pts ride over the phone.     1800: Pt states pain is tolerable at this time and denies nausea.. This RN offered pt more pain meds but he denies stating he rather not take anymore at this time. A block was offered by anesthesia if pt needed it, however pt states he is okay and would rather go home.     1915: DC instructions given to pt, questions answered. Scripts given for NORCO and meloxicam. VSS upon DC. IV removed. Pt was wheeled down to his friends [employer] car  Dressing CDO upon leaving hospital.

## 2020-12-11 NOTE — DISCHARGE INSTRUCTIONS
ACTIVITY: Rest and take it easy for the first 24 hours.  A responsible adult is recommended to remain with you during that time.  It is normal to feel sleepy.  We encourage you to not do anything that requires balance, judgment or coordination.    MILD FLU-LIKE SYMPTOMS ARE NORMAL. YOU MAY EXPERIENCE GENERALIZED MUSCLE ACHES, THROAT IRRITATION, HEADACHE AND/OR SOME NAUSEA.    FOR 24 HOURS DO NOT:  Drive, operate machinery or run household appliances.  Drink beer or alcoholic beverages.   Make important decisions or sign legal documents.    SPECIAL INSTRUCTIONS:   Keep the right arm in the sling immobilizer for the next 2 weeks.  You can come out of the sling to stretch the elbow with the arm at your side.  No lifting pushing or pulling with the right arm  Keep dressings in place for 3 days and then these can be left open to air if there is no further drainage.  Okay to shower starting on Sunday or Monday if the wound shows no drainage.  The arm would need to be kept at your side while showering.    DIET: To avoid nausea, slowly advance diet as tolerated, avoiding spicy or greasy foods for the first day.  Add more substantial food to your diet according to your physician's instructions.  Babies can be fed formula or breast milk as soon as they are hungry.  INCREASE FLUIDS AND FIBER TO AVOID CONSTIPATION.    SURGICAL DRESSING/BATHING: Keep dressings in place for 3 days and then these can be left open to air if there is no further drainage.  Okay to shower starting on Sunday or Monday if the wound shows no drainage.  The arm would need to be kept at your side while showering.      FOLLOW-UP APPOINTMENT:  A follow-up appointment should be arranged with your doctor in 1-2 weeks; call to schedule.    You should CALL YOUR PHYSICIAN if you develop:  Fever greater than 101 degrees F.  Pain not relieved by medication, or persistent nausea or vomiting.  Excessive bleeding (blood soaking through dressing) or unexpected  drainage from the wound.  Extreme redness or swelling around the incision site, drainage of pus or foul smelling drainage.  Inability to urinate or empty your bladder within 8 hours.  Problems with breathing or chest pain.    You should call 911 if you develop problems with breathing or chest pain.  If you are unable to contact your doctor or surgical center, you should go to the nearest emergency room or urgent care center.  Physician's telephone #: [phone] Dr Marquez    If any questions arise, call your doctor.  If your doctor is not available, please feel free to call the Surgical Center at [phone]. The Contact Center is open Monday through Friday 7AM to 5PM and may speak to a nurse at [phone], or toll free at [phone].     A registered nurse may call you a few days after your surgery to see how you are doing after your procedure.    MEDICATIONS: Resume taking daily medication.  Take prescribed pain medication with food.  If no medication is prescribed, you may take non-aspirin pain medication if needed.  PAIN MEDICATION CAN BE VERY CONSTIPATING.  Take a stool softener or laxative such as senokot, pericolace, or milk of magnesia if needed.    Prescription given for Norco (pain), Mobic (NSAID).  Last pain medication given at 10:56am (tylenol).    If your physician has prescribed pain medication that includes Acetaminophen (Tylenol), do not take additional Acetaminophen (Tylenol) while taking the prescribed medication.    Depression / Suicide Risk    As you are discharged from this [facility] facility, it is important to learn how to keep safe from harming yourself.    Recognize the warning signs:  · Abrupt changes in personality, positive or negative- including increase in energy   · Giving away possessions  · Change in eating patterns- significant weight changes-  positive or negative  · Change in sleeping patterns- unable to sleep or sleeping all the time   · Unwillingness or inability  to communicate  · Depression  · Unusual sadness, discouragement and loneliness  · Talk of wanting to die  · Neglect of personal appearance   · Rebelliousness- reckless behavior  · Withdrawal from people/activities they love  · Confusion- inability to concentrate     If you or a loved one observes any of these behaviors or has concerns about self-harm, here's what you can do:  · Talk about it- your feelings and reasons for harming yourself  · Remove any means that you might use to hurt yourself (examples: pills, rope, extension cords, firearm)  · Get professional help from the community (Mental Health, Substance Abuse, psychological counseling)  · Do not be alone:Call your Safe Contact- someone whom you trust who will be there for you.  · Call your local CRISIS HOTLINE 353-2221 or 475-586-2844  · Call your local Children's Mobile Crisis Response Team [place] (641) 473-2485 or www.RealPage  · Call the toll free National Suicide Prevention Hotlines   · National Suicide Prevention Lifeline 556-270-MHJT (6670)  · National Hope Line Network 800-SUICIDE (457-6470)

## 2020-12-11 NOTE — ANESTHESIA QCDR
2019 [facility] Clinical Data Registry (for Quality Improvement)     Postoperative nausea/vomiting risk protocol (Adult = 18 yrs and Pediatric 3-17 yrs)- (430 and 463)  General inhalation anesthetic (NOT TIVA) with PONV risk factors: Yes  Provision of anti-emetic therapy with at least 2 different classes of agents: Yes   Patient DID NOT receive anti-emetic therapy and reason is documented in Medical Record:  N/A    Multimodal Pain Management- (477)  Non-emergent surgery AND patient age >= 18: Yes  Use of Multimodal Pain Management, two or more drugs and/or interventions, NOT including systemic opioids: Yes  Exception: Documented allergy to multiple classes of analgesics: N/A    Smoking Abstinence (404)  Patient is current smoker (cigarette, pipe, e-cig, marijuanna): Yes  Elective Surgery: Yes  Abstinence instructions provided prior to day of surgery: Yes  Patient abstained from smoking on day of surgery: Yes    Pre-Op Beta-Blocker in Isolated CABG (44)  Isolated CABG AND patient age >= 18: No  Beta-blocker admin within 24 hours of surgical incision:   Exception:of medical reason(s) for not administering beta blocker within 24 hours prior to surgical incision (e.g., not  indicated,other medical reason):     PACU assessment of acute postoperative pain prior to Anesthesia Care End- Applies to Patients Age = 18- (ABG7)  Initial PACU pain score is which of the following: < 7/10  Patient unable to report pain score: N/A    Post-anesthetic transfer of care checklist/protocol to PACU/ICU- (426 and 427)  Upon conclusion of case, patient transferred to which of the following locations: PACU/Non-ICU  Use of transfer checklist/protocol: Yes  Exclusion: Service Performed in Patient Hospital Room (and thus did not require transfer): N/A  Unplanned admission to ICU related to anesthesia service up through end of PACU care- (MD51)  Unplanned admission to ICU (not initially anticipated at anesthesia start time): No

## 2020-12-11 NOTE — ANESTHESIA TIME REPORT
Anesthesia Start and Stop Event Times     Date Time Event    12/10/2020 1156 Ready for Procedure     1334 Anesthesia Start     1653 Anesthesia Stop        Responsible Staff  12/10/20    Name Role Begin End    Sigifredo Burgos M.D. Anesth 1334 1653        Preop Diagnosis (Free Text):  Pre-op Diagnosis     OTHER DISLOCATION OF RIGHT SHOULDER, FRACTURE UPPER END RIGHT HUMERUS        Preop Diagnosis (Codes):    Post op Diagnosis  Closed fracture dislocation of right shoulder      Premium Reason  A. 3PM - 7AM    Comments:

## 2020-12-21 NOTE — OP REPORT
DATE OF SERVICE:  12/10/2020     PREOPERATIVE DIAGNOSIS:  Right proximal humerus fracture dislocation involving   the greater tuberosity.     POSTOPERATIVE DIAGNOSIS:  Right proximal humerus fracture dislocation   involving the greater tuberosity.     PROCEDURE PERFORMED:  Open treatment of right proximal humerus fracture   dislocation including internal fixation of greater tuberosity.     SURGEON:  Boston Marquez MD     ASSISTANT:  Ra Bird PA-C     ANESTHESIA:  General.     ESTIMATED BLOOD LOSS:  300 mL.     COMPLICATIONS:  None.     OPERATIVE NDICATIONS:  The patient is a 65-year-old gentleman who has a   history of a fall out of bed roughly two weeks prior, injuring his right   shoulder.  He presented to the orthopedic urgent care with this complaint   after 2 weeks of continued pain.  X-rays showed to have a right proximal   humerus fracture dislocation.  A discussion was had with the patient regarding   treatment options and he elected to undergo operative fixation.  I had a   chance to have this similar discussion with him this morning and we discussed   his diagnosis, reviewed x-rays together and talked about treatment options.    These include attempted closed reduction and a successful possible closed   treatment of the greater tuberosity fracture.  The treatment will be open   reduction and internal fixation of the shoulder ____.  With reduction internal   fixation, this carries risks of wound healing complications, infection,   shoulder pain, stiffness, nonunion, malunion, recurrent dislocation, possible   need for additional procedures for stabilization of the shoulder.  Also risk   of damage to neurovascular structures.  He voiced understanding of this and he   wished to proceed.     DESCRIPTION OF PROCEDURE:  The patient was seen in preoperative holding area   on the day of surgery. The right shoulder was marked with surgeon's initials.    He was then taken to the operating room and placed  supine on a radiolucent   operating table.  Patient does have active COVID-19 based off of a screening   exam, so all precautions were taken for this diagnosis for the staff in the   room.  At this point, the right shoulder was prepped and draped in normal   sterile fashion.  An operative pause was   The correct patient, site, side,   procedure were identified with surgeon's initials in operative extremity.    Preoperative antibiotics were given.  A standard deltopectoral approach was   utilized.  Incision was made from the coracoid down in line with the humeral   shaft was made and subcutaneous tissues were dissected until the cephalic vein   was identified.  This was utilized to guide the blunt dissection between the   deltoid and the pectoral muscles.  This plane was developed and the fracture   was located.  He had an anterior dislocation of the proximal humerus.  The   greater tuberosity was also fractured and posterior to the fracture, already   had some early soft callus forming.  The biceps tendon was in this zone as   well and there was scarring into the soft callus.  This also prevented   mobilization of the proximal humerus.  Therefore, the biceps tendon was tagged   at the level of the pectoral tendon and was incised and then removed from its   intra-articular location and then at the end of the case, this was tenodesed   into the pectoral tendon.  This allowed for better mobilization of the   proximal humerus.  This was then mobilized by placing a clamp near the humeral   neck.  This gave us better ability to apply traction and manipulation of the   shoulder.  This was able to be reduced into the glenohumeral space, but was   quite unstable in this location.  There were signs of anterior labral tear and   the labrum had been quite beat up from this chronic dislocation.  Once the   humerus was relocated, this was stable with the arm in maximal internal   rotation and maximal abduction.  To try to  position now for reduction and   fixation of the greater tuberosity, this caused a repeat dislocation.  This   was fixed wall.  This was in the subluxed dislocated position.  The greater   tuberosity fragment was mobilized.  Sutures were placed in the enthesis of the   supraspinatus tendon.  This allowed for mobilization of this fracture   fragment without risk of comminuting it.  There was good cortical reads   distally on the shaft of the humerus.  This was reduced to these positions and   then clamped in place.  There was some comminution near the superior margin   of the greater tuberosity.  Once these were clamped in place, additional   K-wires were placed to hold this in place.  A Smith and Nephew plate was then   fitted over the tuberosity to further anchor and secure this fracture.  This   was secured with nonlocking screws initially into the shaft into the calcar   region through the tuberosity fracture and further compressed  this down.    Additional locking screws were placed in the more proximal portion of the   head.  These were kept unicortical.  The sutures from the enthesis were also   tied into the plate.  Once this was secured, this restored the normal anatomy   of the proximal humerus.  This was then again relocated into the glenohumeral   joint.  With this repair, there was some better stability and he remained   stable in the internally rotated adducted position.  At this point, the   remainder of the wound was thoroughly irrigated.  The biceps was tenodesed   into the pectoralis tendon.  At this stage, the wounds were closed in layered   fashion with 0 Vicryl to reapproximate the deltopectoral split, 2-0 Vicryl for   subcutaneous tissues and staples for the skin.  Sterile dressings were placed   and then the patient was placed into a shoulder immobilizer in that position   of stability.  He is allowed to awaken in the operating room and taken to PACU   in stable condition.     POSTOPERATIVE  PLAN:  He will remain in this sling and shoulder immobilizer for   the first 2 weeks.  This can be removed for hygiene, but the arm needs to be   kept abducted and internally rotated.  Return to clinic in 2 weeks' time for   staple removal, repeat x-rays and begin outpatient therapy.        ______________________________  MD CATALINA Guallpa/ROSA/NAPOLEON    DD:  12/21/2020 08:08  DT:  12/21/2020 09:25    Job#:  [id number]

## 2021-08-06 ENCOUNTER — OFFICE VISIT (OUTPATIENT)
Dept: NEUROLOGY | Facility: MEDICAL CENTER | Age: 66
End: 2021-08-06
Attending: STUDENT IN AN ORGANIZED HEALTH CARE EDUCATION/TRAINING PROGRAM
Payer: MEDICARE

## 2021-08-06 VITALS
TEMPERATURE: 97.7 F | HEART RATE: 70 BPM | BODY MASS INDEX: 20.19 KG/M2 | HEIGHT: 72 IN | WEIGHT: 149.1 LBS | OXYGEN SATURATION: 96 % | SYSTOLIC BLOOD PRESSURE: 128 MMHG | RESPIRATION RATE: 18 BRPM | DIASTOLIC BLOOD PRESSURE: 74 MMHG

## 2021-08-06 DIAGNOSIS — Z02.4 ENCOUNTER FOR EXAMINATION FOR DRIVING LICENSE: ICD-10-CM

## 2021-08-06 DIAGNOSIS — Z13.31 SCREENING FOR DEPRESSION: ICD-10-CM

## 2021-08-06 DIAGNOSIS — E55.9 VITAMIN D DEFICIENCY: ICD-10-CM

## 2021-08-06 DIAGNOSIS — R56.9 SEIZURE (HCC): ICD-10-CM

## 2021-08-06 PROCEDURE — 99213 OFFICE O/P EST LOW 20 MIN: CPT | Performed by: NURSE PRACTITIONER

## 2021-08-06 PROCEDURE — 99211 OFF/OP EST MAY X REQ PHY/QHP: CPT | Performed by: NURSE PRACTITIONER

## 2021-08-06 RX ORDER — LEVETIRACETAM 500 MG/1
500 TABLET ORAL 2 TIMES DAILY
Qty: 60 TABLET | Refills: 5 | Status: SHIPPED | OUTPATIENT
Start: 2021-08-06 | End: 2022-02-03

## 2021-08-06 ASSESSMENT — PATIENT HEALTH QUESTIONNAIRE - PHQ9: CLINICAL INTERPRETATION OF PHQ2 SCORE: 0

## 2021-08-06 NOTE — PROGRESS NOTES
Chief Complaint   Patient presents with   • Follow-Up     Seizure/ DMV PAPERWORK       Problem List Items Addressed This Visit     Seizure (HCC)    Relevant Medications    levETIRAcetam (KEPPRA) 500 MG Tab    Other Relevant Orders    REFERRAL TO NEURODIAGNOSTICS (EEG,EP,EMG/NCS/DBS)    CBC WITH DIFFERENTIAL    Comp Metabolic Panel    KEPPRA      Other Visit Diagnoses     Vitamin D deficiency        Relevant Orders    VITAMIN D,25 HYDROXY    Encounter for examination for driving license        Screening for depression              Interim History:  Pedro Piper 66 y.o. male presents today for seizure f/u.     Pt reports of no seizures. He is doing well on keppra 500mg BID. No side effects. Moo dis good. No SI or HI. He is taking multivitamins but no vit D. He is driving with no issues and needs his annual clearance. He is avoiding any triggers. He has insurance now and wants to get his EEG done. He does not want MRI brain done yet. No other issues.       Past medical history:   Past Medical History:   Diagnosis Date   • Seizure (HCC)        Past surgical history:   Past Surgical History:   Procedure Laterality Date   • SHOULDER ORIF Right 12/10/2020    Procedure: ORIF, SHOULDER - SHOULDER DISLOCATION, GREATER TUBEROSITY;  Surgeon: Boston Marquez M.D.;  Location: SURGERY [facility];  Service: Orthopedics   • ELBOWPLASTY Right 1987       Family history:   No family history on file.    Social history:   Social History     Socioeconomic History   • Marital status: Single     Spouse name: Not on file   • Number of children: Not on file   • Years of education: Not on file   • Highest education level: Not on file   Occupational History   • Not on file   Tobacco Use   • Smoking status: Current Every Day Smoker     Packs/day: 0.25     Years: 30.00     Pack years: 7.50     Types: Cigarettes   • Smokeless tobacco: Never Used   • Tobacco comment: cutting back slowly.    Vaping Use   • Vaping Use: Never used    Substance and Sexual Activity   • Alcohol use: Never   • Drug use: Yes     Types: Marijuana, Inhaled     Comment: marijuana- last use 12/6/20   • Sexual activity: Not on file   Other Topics Concern   • Not on file   Social History Narrative   • Not on file     Social Determinants of Health     Financial Resource Strain:    • Difficulty of Paying Living Expenses:    Food Insecurity:    • Worried About Running Out of Food in the Last Year:    • Ran Out of Food in the Last Year:    Transportation Needs:    • Lack of Transportation (Medical):    • Lack of Transportation (Non-Medical):    Physical Activity:    • Days of Exercise per Week:    • Minutes of Exercise per Session:    Stress:    • Feeling of Stress :    Social Connections:    • Frequency of Communication with Friends and Family:    • Frequency of Social Gatherings with Friends and Family:    • Attends [employer] Services:    • Active Member of Clubs or Organizations:    • Attends Club or Organization Meetings:    • Marital Status:    Intimate Partner Violence:    • Fear of Current or Ex-Partner:    • Emotionally Abused:    • Physically Abused:    • Sexually Abused:        Current medications:   Current Outpatient Medications   Medication   • meloxicam (MOBIC) 7.5 MG Tab   • therapeutic multivitamin-minerals (THERAGRAN-M) Tab   • levETIRAcetam (KEPPRA) 500 MG Tab     No current facility-administered medications for this visit.       Medication Allergy:  No Known Allergies      Review of systems:     General: Denies fevers or chills, or nightsweats, or generalized fatigue.    Head: Denies headaches or dizziness or lightheadedness  Musculoskeletal: Denies muscle pain or swelling, no atrophy, no neck and back pain or stiffness.   Neurologic: Denies facial droopiness, muscle weakness (focal or generalized), paresthesias, ataxia, change in speech or language, memory loss, abnormal movements. Hx of seizures  Psychiatric: Denies anxiety, depression, mood swings,  suicidal or homicidal thoughts       Physical examination:   Vitals:    08/06/21 1116   BP: 128/74   BP Location: Right arm   Patient Position: Sitting   BP Cuff Size: Adult   Pulse: 70   Resp: 18   Temp: 36.5 °C (97.7 °F)   TempSrc: Temporal   SpO2: 96%   Weight: 67.6 kg (149 lb 1.6 oz)   Height: 1.829 m (6')     General: Patient in no acute distress, pleasant and cooperative.  HEENT: Normocephalic, no signs of acute trauma.   Neck: Supple. There is normal range of motion.   Resp: clear to auscultation bilaterally. No wheezes or crackles.   CV: RRR, no murmurs.   Skin: no signs of acute rashes or trauma.   Musculoskeletal: joints exhibit full range of motion  Psychiatric: No hallucinatory behavior. No symptoms of depression or suicidal ideation. Mood and affect appear normal on exam.     NEUROLOGICAL EXAM:   Mental status, orientation: Awake, alert and fully oriented.   Speech and language: speech is clear and fluent. The patient is able to name, repeat and comprehend.   Memory: There is intact recollection of recent and remote events.   Motor exam: Strength is 5/5 in all extremities. Tone is normal. No abnormal movements were seen on exam.   Sensory exam reveals normal sense of light touchin all extremities.   Gait: The patient was able to get up from seated position on first attempt without requiring assistance. Found to be steady when walking. Movements were fluid with normal arm swing.     ANCILLARY DATA REVIEWED:       Lab Data Review:  Reviewed in chart.     Records reviewed:   Reviewed in chart.    Imaging:   CT head 1/29/2018  Normal CT head w/o contrast     MRI brain w/o 11/26/2016  No acute intracranial pathology is seen to explain pt's symptoms  Mild chronic periventricular white matter microischemic changes  Tiny hemosiderin deposit in the left posterior temporal lobe    EEG:  n/a        ASSESSMENT AND PLAN:    1. Seizure (HCC)  - REFERRAL TO NEURODIAGNOSTICS (EEG,EP,EMG/NCS/DBS)  - CBC WITH  DIFFERENTIAL; Future  - Comp Metabolic Panel; Future  - KEPPRA; Future  - levETIRAcetam (KEPPRA) 500 MG Tab; Take 1 tablet by mouth 2 times a day.  Dispense: 60 tablet; Refill: 5    2. Vitamin D deficiency  - VITAMIN D,25 HYDROXY; Future    3. Encounter for examination for driving license    4. 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 Pt had initial episode of head bobbing then passing out in 2016 then a year later, he had a similar spell. No post-ictal confusion or disorientation. No tongue biting or incontinence. He was put on dilantin 100mg, 2 tabs BID by prior neurologist. No records to review. Will need to obtain results of EEG and MRI brain. He attributes his spells to bad diet as he was eating junk foods and not eating regularly then. No auras or triggers to his spells that he knows off. His CT head was unremarkable. His MRI brain w/o showed tiny hemosiderin deposit in the left posterior temporal lobe - no image to review. Pt has insurance now and wants work up done. He wants to start with EEG for now. He remains seizure/ spell free. Last seizure was on 1/29/2018     No family hx of epilepsy. No TBI hx      No alcohol use.  Smokes cigarettes.  He uses marijuana occasionally     Mood is good. No suicidal or homicidal thoughts.     Past ASM's: dilantin     Current ASM's: Keppra 500mg BID.        Plan:  -continue ASM.     - routine EEG.     -Will discuss MRI brain next time as he doesn't want to get this done.      - Discussed avoidance of spell/sz triggers: alcohol, sleep deprivation, and stress.     - Discussed Vit D supplementation. Recommended taking 2000-5000u daily.     - Discussed driving restrictions. Okay to drive but aware to stop driving immediately if a spell occurs and report to us. DMV paperwork faxed and given to pt.      -Labs to be checked for next appointment: CBC, CMP, vit D and keppra    -Pt refused colonoscopy.         FOLLOW-UP:   Return in  about 3 months (around 11/6/2021).      EDUCATION AND COUNSELING:  -Education was provided to the patient and/or family regarding diagnosis and prognosis. The chronic and unpredictable nature of the condition were discussed. There is increased risk for additional events, which may carry potential for significant injuries and death. Discussed frequent seizure triggers: sleep deprivation, medication non-compliance, use of illegal drugs/alcohol, stress, and others.   -We reviewed in detail the current antiepileptic regimen. Potential side effects of antiepileptics were discussed at length, including but no limited to: hypersensitivity reactions (rash and others, some of which can be fatal), visual field changes (some of which may be irreversible), glaucoma, diplopia, kidney stones, osteopenia/osteoporosis/bone fractures, hyperthermia/anhydrosis, hyponatremia, tremors/abnormal movements, ataxia, dizziness, fatigue, increased risk for falls, risk for cardiac arrhythmias/syncope, gastrointestinal side effects(hepatitis, pancreatitis, gastritis, ulcers), gingival hypertrophy/bleeding, drowsiness, sedation, anxiety/nervousness, increased risk for suicide, increased risk for depression, and psychosis.   -We also reviewed drug-drug interactions and their potential effect on seizure control and medication side effects.    -Recommend chronic vitamin D supplementation and regular exercise (if not contraindicated).   -Patient/family educated on risk for SUDEP (Sudden Death in Epilepsy). Counseling was provided on the importance of strict medication and follow up compliance. The patient/family understand the risks associated with non-adherence with the medical plan as outlined, including but not limited to an increased risk for breakthrough seizures, which may contribute to injuries, disability, status epilepticus, and even death.   -Counseling was also provided on potential effects of alcohol and other drugs, which may lower  seizure threshold and/or affect the metabolism of antiepileptic drugs. We recommend avoidance of alcohol and illegal drugs.  -Avoid sleep deprivation.   -We extensively discussed the aspects related to safety in drivers who suffer from epilepsy. The patient is encourage to report to the Division of Motor Vehicles of any condition and/or spells related to confusion, disorientation, and/or loss of awareness and/or loss of consciousness; as these may pose a safety issue if they occur while operating a motor vehicle. The patient and/or family are ultimately responsible for exercising caution and abiding to regulations in place.   -Other seizure precautions were discussed at length, including no diving, no skydiving, no climbing or exposure to unprotected heights, no unsupervised swimming, no Jacuzzi or bathing in bathtubs or deep bodies of water. The patient/family have been advised about risks for operating any machinery while suffering from seizures / syncope / epilepsy and/or while taking antiepileptic drugs.   -The patient understands and agrees that due to the complexity of his/her diagnosis, results of any testing and further recommendations will typically be discussed/made during a face to face encounter in my office. The patient and/or family further understands it is their responsibility to keep proper follow up.     Patient agrees with plan, as outlined.         Latrice Dorantes, MSN, APRN, FNP-C  [facility]  Office: [phone]  Fax: [phone]    BILLING DOCUMENTATION:     Total time spent including chart review before and after visit was 22min. Over 50% of the time of the visit today was spent on counseling and or coordination of care wtih the patient and/or family, with greater than 50% of the total discussing my assessment and plan as stated above.

## 2021-08-06 NOTE — ANESTHESIA POSTPROCEDURE EVALUATION
Patient: Pedro Piper    Procedure Summary     Date: 12/10/20 Room / Location: [place] [address] / SURGERY [facility]    Anesthesia Start: 1334 Anesthesia Stop: 1653    Procedure: ORIF, SHOULDER - SHOULDER DISLOCATION, GREATER TUBEROSITY (Right Shoulder) Diagnosis: (OTHER DISLOCATION OF RIGHT SHOULDER, FRACTURE UPPER END RIGHT HUMERUS)    Surgeons: Boston Marquez M.D. Responsible Provider: Sigifredo Burgos M.D.    Anesthesia Type: general, peripheral nerve block ASA Status: 3          Final Anesthesia Type: general, peripheral nerve block  Last vitals  BP   Blood Pressure: 158/79    Temp   35.9 °C (96.6 °F)    Pulse   Pulse: (!) 109   Resp   16    SpO2   95 %      Anesthesia Post Evaluation    Patient location during evaluation: PACU  Patient participation: complete - patient participated  Level of consciousness: awake and alert  Pain score: 0    Airway patency: patent  Anesthetic complications: no  Cardiovascular status: hemodynamically stable  Respiratory status: acceptable  Hydration status: euvolemic    PONV: none                    #1:

## 2021-10-21 NOTE — PROGRESS NOTES
Chief Complaint   Patient presents with   • Follow-Up     Seizure       Problem List Items Addressed This Visit     Seizure (HCC)      Other Visit Diagnoses     Screening for depression              Interim History:  Pedro Piper 66 y.o. male presents today for f/u    Pt reports of no seizures and he is wanting to wean off of keppra as he believes he does not suffer from epilepsy and does not think he needs further work-up. He does not remember seeing his PCP last year and wants to re-establish care with her again. Mood is good. No SI or Hi.No issues with driving.      Past medical history:   Past Medical History:   Diagnosis Date   • Seizure (HCC)        Past surgical history:   Past Surgical History:   Procedure Laterality Date   • SHOULDER ORIF Right 12/10/2020    Procedure: ORIF, SHOULDER - SHOULDER DISLOCATION, GREATER TUBEROSITY;  Surgeon: Boston Marquez M.D.;  Location: SURGERY [facility];  Service: Orthopedics   • ELBOWPLASTY Right 1987       Family history:   History reviewed. No pertinent family history.    Social history:   Social History     Socioeconomic History   • Marital status: Single     Spouse name: Not on file   • Number of children: Not on file   • Years of education: Not on file   • Highest education level: Not on file   Occupational History   • Not on file   Tobacco Use   • Smoking status: Current Every Day Smoker     Packs/day: 0.25     Years: 30.00     Pack years: 7.50     Types: Cigarettes   • Smokeless tobacco: Never Used   • Tobacco comment: cutting back slowly.    Vaping Use   • Vaping Use: Never used   Substance and Sexual Activity   • Alcohol use: Never   • Drug use: Yes     Types: Marijuana, Inhaled     Comment: marijuana- last use 12/6/20   • Sexual activity: Not on file   Other Topics Concern   • Not on file   Social History Narrative   • Not on file     Social Determinants of Health     Financial Resource Strain:    • Difficulty of Paying Living Expenses:    Food  Insecurity:    • Worried About Running Out of Food in the Last Year:    • Ran Out of Food in the Last Year:    Transportation Needs:    • Lack of Transportation (Medical):    • Lack of Transportation (Non-Medical):    Physical Activity:    • Days of Exercise per Week:    • Minutes of Exercise per Session:    Stress:    • Feeling of Stress :    Social Connections:    • Frequency of Communication with Friends and Family:    • Frequency of Social Gatherings with Friends and Family:    • Attends [employer] Services:    • Active Member of Clubs or Organizations:    • Attends Club or Organization Meetings:    • Marital Status:    Intimate Partner Violence:    • Fear of Current or Ex-Partner:    • Emotionally Abused:    • Physically Abused:    • Sexually Abused:        Current medications:   Current Outpatient Medications   Medication   • levETIRAcetam (KEPPRA) 500 MG Tab   • meloxicam (MOBIC) 7.5 MG Tab   • therapeutic multivitamin-minerals (THERAGRAN-M) Tab     No current facility-administered medications for this visit.       Medication Allergy:  No Known Allergies      Review of systems:     General: Denies fevers or chills, or nightsweats, or generalized fatigue.    Head: Denies headaches or dizziness or lightheadedness  Musculoskeletal: Denies muscle pain or swelling, no atrophy, no neck and back pain or stiffness.   Neurologic: Denies facial droopiness, muscle weakness (focal or generalized), paresthesias, ataxia, change in speech or language, memory loss, abnormal movements, seizures, loss of consciousness, or episodes of confusion.   Psychiatric: Denies anxiety, depression, mood swings, suicidal or homicidal thoughts       Physical examination:   Vitals:    11/05/21 1131   BP: 128/74   BP Location: Right arm   Patient Position: Sitting   BP Cuff Size: Adult   Pulse: 92   Resp: 14   Temp: 36.3 °C (97.4 °F)   TempSrc: Temporal   SpO2: 95%   Weight: 70.9 kg (156 lb 3.2 oz)   Height: 1.829 m (6')     General: Patient  in no acute distress, pleasant and cooperative.  HEENT: Normocephalic, no signs of acute trauma.   Neck: Supple. There is normal range of motion.   Skin: no signs of acute rashes or trauma.   Musculoskeletal: joints exhibit full range of motion  Psychiatric: No hallucinatory behavior. No symptoms of depression or suicidal ideation. Mood and affect appear normal on exam.     NEUROLOGICAL EXAM:   Mental status, orientation: Awake, alert and fully oriented.   Speech and language: speech is clear and fluent. The patient is able to name, repeat and comprehend.   Memory: There is intact recollection of recent and remote events.   Motor exam: Strength is 5/5 in all extremities. Tone is normal. No abnormal movements were seen on exam.   Sensory exam reveals normal sense of light touch, proprioception, vibration and pinprick in all extremities.   Gait: The patient was able to get up from seated position on first attempt without requiring assistance.      ANCILLARY DATA REVIEWED:       Lab Data Review:  Reviewed in chart.     Records reviewed:   Reviewed in chart.    Imaging:   CT head 1/29/2018  Normal CT head w/o contrast     MRI brain w/o 11/26/2016  No acute intracranial pathology is seen to explain pt's symptoms  Mild chronic periventricular white matter microischemic changes  Tiny hemosiderin deposit in the left posterior temporal lobe     EEG:  n/a      ASSESSMENT AND PLAN:    1. Seizure (HCC)    2. 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 Pt had initial episode of head bobbing then passing out in 2016 then a year later, he had a similar spell. No post-ictal confusion or disorientation. No tongue biting or incontinence. He was put on dilantin 100mg, 2 tabs BID by prior neurologist. No records to review. He attributes his spells to bad diet as he was eating junk foods and not eating regularly then. No auras or triggers to his spells that he knows off. His CT head was  unremarkable. His MRI brain w/o showed tiny hemosiderin deposit in the left posterior temporal lobe - no image to review. Last seizure/spell was on 1/29/2018. Pt changed his mind and wants to be weaned off keppra but since he hasn't seen his PCP in a while, he wants to re-establish with her first and will let me know if he wants to get work-up done and wean off.      No family hx of epilepsy. No TBI hx      No alcohol use.  Smokes cigarettes.  He uses marijuana occasionally     Mood is good. No suicidal or homicidal thoughts.     Past ASM's: dilantin     Current ASM's: Keppra 500mg BID.        Plan:  -he wants to continue with keppra for now until he talks to his PCP.     -Recommended work-up MRI brain, EEG and blood work.     - Discussed avoidance of spell/sz triggers: alcohol, sleep deprivation, and stress.     - Discussed Vit D supplementation. Recommended taking 2000-5000u daily.     - Discussed driving restrictions. Okay to drive but aware to stop driving immediately if a spell occurs and report to us.     -Labs to be checked for next appointment: CBC, CMP, vit D and keppra- reprinted           FOLLOW-UP:   Return in about 2 months (around 1/5/2022).      EDUCATION AND COUNSELING:  -Education was provided to the patient and/or family regarding diagnosis and prognosis. The chronic and unpredictable nature of the condition were discussed. There is increased risk for additional events, which may carry potential for significant injuries and death. Discussed frequent seizure triggers: sleep deprivation, medication non-compliance, use of illegal drugs/alcohol, stress, and others.   -We reviewed in detail the current antiepileptic regimen. Potential side effects of antiepileptics were discussed at length, including but no limited to: hypersensitivity reactions (rash and others, some of which can be fatal), visual field changes (some of which may be irreversible), glaucoma, diplopia, kidney stones,  osteopenia/osteoporosis/bone fractures, hyperthermia/anhydrosis, hyponatremia, tremors/abnormal movements, ataxia, dizziness, fatigue, increased risk for falls, risk for cardiac arrhythmias/syncope, gastrointestinal side effects(hepatitis, pancreatitis, gastritis, ulcers), gingival hypertrophy/bleeding, drowsiness, sedation, anxiety/nervousness, increased risk for suicide, increased risk for depression, and psychosis.   -We also reviewed drug-drug interactions and their potential effect on seizure control and medication side effects.    -Recommend chronic vitamin D supplementation and regular exercise (if not contraindicated).   -Patient/family educated on risk for SUDEP (Sudden Death in Epilepsy). Counseling was provided on the importance of strict medication and follow up compliance. The patient/family understand the risks associated with non-adherence with the medical plan as outlined, including but not limited to an increased risk for breakthrough seizures, which may contribute to injuries, disability, status epilepticus, and even death.   -Counseling was also provided on potential effects of alcohol and other drugs, which may lower seizure threshold and/or affect the metabolism of antiepileptic drugs. We recommend avoidance of alcohol and illegal drugs.  -Avoid sleep deprivation.   -We extensively discussed the aspects related to safety in drivers who suffer from epilepsy. The patient is encourage to report to the Division of Motor Vehicles of any condition and/or spells related to confusion, disorientation, and/or loss of awareness and/or loss of consciousness; as these may pose a safety issue if they occur while operating a motor vehicle. The patient and/or family are ultimately responsible for exercising caution and abiding to regulations in place.   -Other seizure precautions were discussed at length, including no diving, no skydiving, no climbing or exposure to unprotected heights, no unsupervised  swimming, no Jacuzzi or bathing in bathtubs or deep bodies of water. The patient/family have been advised about risks for operating any machinery while suffering from seizures / syncope / epilepsy and/or while taking antiepileptic drugs.   -The patient understands and agrees that due to the complexity of his/her diagnosis, results of any testing and further recommendations will typically be discussed/made during a face to face encounter in my office. The patient and/or family further understands it is their responsibility to keep proper follow up.     Patient agrees with plan, as outlined.         Latrice Dorantes, MSN, APRN, FNP-C  [facility]  Office: [phone]  Fax: [phone]    BILLING DOCUMENTATION:   Total time spent including chart review before and after visit was 36min. Over 50% of the time of the visit today was spent on counseling and or coordination of care wtih the patient and/or family, with greater than 50% of the total discussing my assessment and plan as stated above.

## 2021-11-05 ENCOUNTER — OFFICE VISIT (OUTPATIENT)
Dept: NEUROLOGY | Facility: MEDICAL CENTER | Age: 66
End: 2021-11-05
Attending: NURSE PRACTITIONER
Payer: MEDICARE

## 2021-11-05 VITALS
RESPIRATION RATE: 14 BRPM | OXYGEN SATURATION: 95 % | WEIGHT: 156.2 LBS | HEART RATE: 92 BPM | DIASTOLIC BLOOD PRESSURE: 74 MMHG | BODY MASS INDEX: 21.16 KG/M2 | TEMPERATURE: 97.4 F | HEIGHT: 72 IN | SYSTOLIC BLOOD PRESSURE: 128 MMHG

## 2021-11-05 DIAGNOSIS — Z13.31 SCREENING FOR DEPRESSION: ICD-10-CM

## 2021-11-05 DIAGNOSIS — R56.9 SEIZURE (HCC): ICD-10-CM

## 2021-11-05 PROCEDURE — 99214 OFFICE O/P EST MOD 30 MIN: CPT | Performed by: NURSE PRACTITIONER

## 2021-11-05 PROCEDURE — 99211 OFF/OP EST MAY X REQ PHY/QHP: CPT | Performed by: NURSE PRACTITIONER

## 2021-11-05 PROCEDURE — 99211 OFF/OP EST MAY X REQ PHY/QHP: CPT

## 2021-11-15 ENCOUNTER — OFFICE VISIT (OUTPATIENT)
Dept: MEDICAL GROUP | Facility: PHYSICIAN GROUP | Age: 66
End: 2021-11-15
Payer: MEDICARE

## 2021-11-15 VITALS
HEART RATE: 75 BPM | WEIGHT: 155 LBS | SYSTOLIC BLOOD PRESSURE: 126 MMHG | BODY MASS INDEX: 20.99 KG/M2 | HEIGHT: 72 IN | RESPIRATION RATE: 14 BRPM | OXYGEN SATURATION: 97 % | TEMPERATURE: 97 F | DIASTOLIC BLOOD PRESSURE: 80 MMHG

## 2021-11-15 DIAGNOSIS — R56.9 SEIZURE (HCC): ICD-10-CM

## 2021-11-15 DIAGNOSIS — Z72.0 TOBACCO USE: ICD-10-CM

## 2021-11-15 PROCEDURE — 99213 OFFICE O/P EST LOW 20 MIN: CPT | Performed by: PHYSICIAN ASSISTANT

## 2021-11-15 NOTE — PROGRESS NOTES
CC:   Chief Complaint   Patient presents with   • Follow-Up     seizure follow up          HISTORY OF PRESENT ILLNESS: Patient is a 66 y.o. male established patient who presents today to follow up on the following conditions:      Seizure (HCC)  Patient presents today for follow-up.  Patient history of seizures.  Last saw neurology on November 5, 2021 in which they discussed tapering off of his Keppra.  Patient states that he has not had a seizure in many years and that he does not think he needs a further work-up nor to be on the medication.  Patient's initial episode was head-bobbing and passing out in 2016 and a year later had a similar spell.  The patient was suspicious that his poor diet was the cause of these events.  He had a CT of the head that was unremarkable, MRI of the brain showed tiny hemosiderin deposit left posterior temporal lobe.  Last seizure/spell was January 29, 2018.  Does not use alcohol he does smoke cigarettes.  Uses marijuana occasionally.  Neurology recommended an MRI of the brain and EEG and blood work.  Discussed this information with the patient again today.    Tobacco use  Still smoking 1 1/2 cigarettes a day, he's tapering down slowly. Doing well with this.       Patient Active Problem List    Diagnosis Date Noted   • Seizure (HCC) 07/21/2020   • Tobacco use 07/21/2020      Allergies:Patient has no known allergies.    Current Outpatient Medications   Medication Sig Dispense Refill   • levETIRAcetam (KEPPRA) 500 MG Tab Take 1 tablet by mouth 2 times a day. 60 tablet 5   • therapeutic multivitamin-minerals (THERAGRAN-M) Tab Take 1 Tab by mouth every day.       No current facility-administered medications for this visit.       Social History     Tobacco Use   • Smoking status: Current Every Day Smoker     Packs/day: 0.25     Years: 30.00     Pack years: 7.50     Types: Cigarettes   • Smokeless tobacco: Never Used   • Tobacco comment: cutting back slowly.    Vaping Use   • Vaping Use:  Never used   Substance Use Topics   • Alcohol use: Never   • Drug use: Yes     Types: Marijuana, Inhaled     Comment: marijuana- last use 12/6/20     Social History     Social History Narrative   • Not on file       History reviewed. No pertinent family history.    Review of Systems:    Constitutional: No Fevers, Chills  Eyes: No vision changes  ENT: No hearing changes  Resp: No Shortness of breath  CV: No Chest pain  GI: No Nausea/Vomiting  MSK: No weakness  Skin: No rashes  Neuro: No Headaches  Psych: No Suicidal ideations    All remaining systems reviewed and found to be negative, except as stated above.    Exam:    /80   Pulse 75   Temp 36.1 °C (97 °F)   Resp 14   Ht 1.829 m (6')   Wt 70.3 kg (155 lb)   SpO2 97%  Body mass index is 21.02 kg/m².    General:  Well nourished, well developed male in NAD  HENT: Atraumatic, normocephalic  EYES: Extraocular movements intact  NECK: Supple, FROM  CHEST: No deformities, Equal chest expansion  RESP: Unlabored, no stridor or audible wheeze  HEART: Regular Rate and rhythm.   Extremities: No Clubbing, Cyanosis, or Edema  Skin: Warm/dry, without rashes  Neuro: A/O x 4, due to COVID-19- did not have patient remove face mask to test cranial nerves.  Motor/sensory grossly intact  Psych: Normal behavior, normal affect      Assessment/Plan:  1. Seizure (HCC)  Highly recommend the work up as requested by Dr. Dorantes. Discussed in detail with patient today. Recommend labs, EEG and MRI. He will call medicare first to see what the cost may be and then will call the neurology office if he would like to proceed. Continue Keppra for now until follow up with neurology.   2. Tobacco use  Still smoking 1 1/2 cigarettes a day, he's tapering down slowly. Doing well with this.        Recommend that the patient get other routine labs in addition to the labs from Dr. Dorantes including a TSH, PSA and lipid panel, patient declines at this time understanding risks.    Follow-up: Return  as needed.    Please note that this dictation was created using voice recognition software. I have made every reasonable attempt to correct obvious errors, but I expect that there are errors of grammar and possibly content that I did not discover before finalizing the note.

## 2021-11-15 NOTE — ASSESSMENT & PLAN NOTE
Patient presents today for follow-up.  Patient history of seizures.  Last saw neurology on November 5, 2021 in which they discussed tapering off of his Keppra.  Patient states that he has not had a seizure in many years and that he does not think he needs a further work-up nor to be on the medication.  Patient's initial episode was head-bobbing and passing out in 2016 and a year later had a similar spell.  The patient was suspicious that his poor diet was the cause of these events.  He had a CT of the head that was unremarkable, MRI of the brain showed tiny hemosiderin deposit left posterior temporal lobe.  Last seizure/spell was January 29, 2018.  Does not use alcohol he does smoke cigarettes.  Uses marijuana occasionally.  Neurology recommended an MRI of the brain and EEG and blood work.  Discussed this information with the patient again today.

## 2021-12-21 NOTE — PROGRESS NOTES
No chief complaint on file.      Problem List Items Addressed This Visit     None          Interim History:  Pedro Piper 66 y.o. male presents today for ***    Pt reports of no seizures and he is wanting to wean off of keppra as he believes he does not suffer from epilepsy and does not think he needs further work-up. He does not remember seeing his PCP last year and wants to re-establish care with her again. Mood is good. No SI or Hi.No issues with driving.    Past medical history:   Past Medical History:   Diagnosis Date   • Seizure (HCC)        Past surgical history:   Past Surgical History:   Procedure Laterality Date   • SHOULDER ORIF Right 12/10/2020    Procedure: ORIF, SHOULDER - SHOULDER DISLOCATION, GREATER TUBEROSITY;  Surgeon: Boston Marquez M.D.;  Location: SURGERY [facility];  Service: Orthopedics   • ELBOWPLASTY Right 1987       Family history:   No family history on file.    Social history:   Social History     Socioeconomic History   • Marital status: Single     Spouse name: Not on file   • Number of children: Not on file   • Years of education: Not on file   • Highest education level: Not on file   Occupational History   • Not on file   Tobacco Use   • Smoking status: Current Every Day Smoker     Packs/day: 0.25     Years: 30.00     Pack years: 7.50     Types: Cigarettes   • Smokeless tobacco: Never Used   • Tobacco comment: cutting back slowly.    Vaping Use   • Vaping Use: Never used   Substance and Sexual Activity   • Alcohol use: Never   • Drug use: Yes     Types: Marijuana, Inhaled     Comment: marijuana- last use 12/6/20   • Sexual activity: Not on file   Other Topics Concern   • Not on file   Social History Narrative   • Not on file     Social Determinants of Health     Financial Resource Strain:    • Difficulty of Paying Living Expenses: Not on file   Food Insecurity:    • Worried About Running Out of Food in the Last Year: Not on file   • Ran Out of Food in the Last Year: Not on  file   Transportation Needs:    • Lack of Transportation (Medical): Not on file   • Lack of Transportation (Non-Medical): Not on file   Physical Activity:    • Days of Exercise per Week: Not on file   • Minutes of Exercise per Session: Not on file   Stress:    • Feeling of Stress : Not on file   Social Connections:    • Frequency of Communication with Friends and Family: Not on file   • Frequency of Social Gatherings with Friends and Family: Not on file   • Attends [employer] Services: Not on file   • Active Member of Clubs or Organizations: Not on file   • Attends Club or Organization Meetings: Not on file   • Marital Status: Not on file   Intimate Partner Violence:    • Fear of Current or Ex-Partner: Not on file   • Emotionally Abused: Not on file   • Physically Abused: Not on file   • Sexually Abused: Not on file   Housing Stability:    • Unable to Pay for Housing in the Last Year: Not on file   • Number of Places Lived in the Last Year: Not on file   • Unstable Housing in the Last Year: Not on file       Current medications:   Current Outpatient Medications   Medication   • levETIRAcetam (KEPPRA) 500 MG Tab   • therapeutic multivitamin-minerals (THERAGRAN-M) Tab     No current facility-administered medications for this visit.       Medication Allergy:  No Known Allergies      Review of systems:     General: Denies fevers or chills, or nightsweats, or generalized fatigue.    Head: Denies headaches or dizziness or lightheadedness  EENT: Denies vision changes, vision loss or pain, nasal secretion, nasal bleeding, difficulty swallowing, hearing loss, tinnitus, vertigo, ear pain  Endocdrinologic: Denies sweating, cold or heat intolerance. No polyuria or polydipsia.   Respiratory: Denies shortness of breath, cough, sputum, or wheezing  Cardiac: Denies chest pain, palpitations, edema or syncope  Gastrointestinal: Denies nausea, vomiting, no abdominal pain or change in bowel habits, no melena or hematochezia  Urinary:  Denies dysuria, frequency, hesitancy, or incontinence.  Dermatologic:  Denies new rash  Musculoskeletal: Denies muscle pain or swelling, no atrophy, no neck and back pain or stiffness.   Neurologic: Denies facial droopiness, muscle weakness (focal or generalized), paresthesias, ataxia, change in speech or language, memory loss, abnormal movements, seizures, loss of consciousness, or episodes of confusion.   Psychiatric: Denies anxiety, depression, mood swings, suicidal or homicidal thoughts       Physical examination:   There were no vitals filed for this visit.  General: Patient in no acute distress, pleasant and cooperative.  HEENT: Normocephalic, no signs of acute trauma.   moist conjunctivae. Nares are patent. Oropharynx clear without lesions and normal  hard and soft palates.   Neck: Supple. There is normal range of motion.   Resp: clear to auscultation bilaterally. No wheezes or crackles.   CV: RRR, no murmurs.   Abdomen: normoactive bowel sounds, soft, non distended or tender.   Skin: no signs of acute rashes or trauma.   Musculoskeletal: joints exhibit full range of motion, without any pain to palpation. There are no signs of joint or muscle swelling. There is no tenderness to deep palpation of muscles.   Psychiatric: No hallucinatory behavior. No symptoms of depression or suicidal ideation. Mood and affect appear normal on exam.     NEUROLOGICAL EXAM:   Mental status, orientation: Awake, alert and fully oriented.   Speech and language: speech is clear and fluent. The patient is able to name, repeat and comprehend.   Memory: There is intact recollection of recent and remote events.   Cranial nerve exam:   CN I: Not examined   CN II: PERRL. Fundoscopic exam was unremarkable.  CN III, IV, VI: EOMI; no nystagmus   CN V: Facial sensation intact bilaterally   CN VII: face symmetric   CN VIII: hearing intact to finger rub bilaterally   CN IX, X: palate elevates symmetrically   CN XI: Symmetric shoulder shrug  CN  XII: tongue midline. No signs of tongue biting or fasciculations   Motor exam: Strength is 5/5 in all extremities. Tone is normal. No abnormal movements were seen on exam.   Sensory exam reveals normal sense of light touch, proprioception, vibration and pinprick in all extremities.   Deep tendon reflexes:  2+ throughout. Plantar responses are flexor. There is no clonus.   Coordination: shows a normal finger-nose-finger. Normal rapidly alternating movements.   Gait: The patient was able to get up from seated position on first attempt without requiring assistance. Found to be steady when walking. Movements were fluid with normal arm swing. The patient was able to turn without difficulties or tendency to fall. Romberg exam ***      ANCILLARY DATA REVIEWED:       Lab Data Review:  Reviewed in chart. No results found for this or any previous visit (from the past 24 hour(s)).      Records reviewed:   Reviewed in chart.    Imaging:   CT head 1/29/2018  Normal CT head w/o contrast     MRI brain w/o 11/26/2016  No acute intracranial pathology is seen to explain pt's symptoms  Mild chronic periventricular white matter microischemic changes  Tiny hemosiderin deposit in the left posterior temporal lobe     EEG:  n/a      ASSESSMENT AND PLAN:    There are no diagnoses linked to this encounter.        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 Pt had initial episode of head bobbing then passing out in 2016 then a year later, he had a similar spell. No post-ictal confusion or disorientation. No tongue biting or incontinence. He was put on dilantin 100mg, 2 tabs BID by prior neurologist. No records to review. He attributes his spells to bad diet as he was eating junk foods and not eating regularly then. No auras or triggers to his spells that he knows off. His CT head was unremarkable. His MRI brain w/o showed tiny hemosiderin deposit in the left posterior temporal lobe - no image to review. Last seizure/spell was on  1/29/2018. Pt changed his mind and wants to be weaned off keppra but since he hasn't seen his PCP in a while, he wants to re-establish with her first and will let me know if he wants to get work-up done and wean off.      No family hx of epilepsy. No TBI hx      No alcohol use.  Smokes cigarettes.  He uses marijuana occasionally     Mood is good. No suicidal or homicidal thoughts.     Past ASM's: dilantin     Current ASM's: Keppra 500mg BID.        Plan:  -he wants to continue with keppra for now until he talks to his PCP.      -Recommended work-up MRI brain, EEG and blood work.      - Discussed avoidance of spell/sz triggers: alcohol, sleep deprivation, and stress.     - Discussed Vit D supplementation. Recommended taking 2000-5000u daily.     - Discussed driving restrictions. Okay to drive but aware to stop driving immediately if a spell occurs and report to us.     -Labs to be checked for next appointment: CBC, CMP, vit D and keppra- reprinted        FOLLOW-UP:   No follow-ups on file.      EDUCATION AND COUNSELING:  -Education was provided to the patient and/or family regarding diagnosis and prognosis. The chronic and unpredictable nature of the condition were discussed. There is increased risk for additional events, which may carry potential for significant injuries and death. Discussed frequent seizure triggers: sleep deprivation, medication non-compliance, use of illegal drugs/alcohol, stress, and others.   -We reviewed in detail the current antiepileptic regimen. Potential side effects of antiepileptics were discussed at length, including but no limited to: hypersensitivity reactions (rash and others, some of which can be fatal), visual field changes (some of which may be irreversible), glaucoma, diplopia, kidney stones, osteopenia/osteoporosis/bone fractures, hyperthermia/anhydrosis, hyponatremia, tremors/abnormal movements, ataxia, dizziness, fatigue, increased risk for falls, risk for cardiac  arrhythmias/syncope, gastrointestinal side effects(hepatitis, pancreatitis, gastritis, ulcers), gingival hypertrophy/bleeding, drowsiness, sedation, anxiety/nervousness, increased risk for suicide, increased risk for depression, and psychosis.   -We also reviewed drug-drug interactions and their potential effect on seizure control and medication side effects.    -Recommend chronic vitamin D supplementation and regular exercise (if not contraindicated).   -Patient/family educated on risk for SUDEP (Sudden Death in Epilepsy). Counseling was provided on the importance of strict medication and follow up compliance. The patient/family understand the risks associated with non-adherence with the medical plan as outlined, including but not limited to an increased risk for breakthrough seizures, which may contribute to injuries, disability, status epilepticus, and even death.   -Counseling was also provided on potential effects of alcohol and other drugs, which may lower seizure threshold and/or affect the metabolism of antiepileptic drugs. We recommend avoidance of alcohol and illegal drugs.  -Avoid sleep deprivation.   -We extensively discussed the aspects related to safety in drivers who suffer from epilepsy. The patient is encourage to report to the Division of Motor Vehicles of any condition and/or spells related to confusion, disorientation, and/or loss of awareness and/or loss of consciousness; as these may pose a safety issue if they occur while operating a motor vehicle. The patient and/or family are ultimately responsible for exercising caution and abiding to regulations in place.   -Other seizure precautions were discussed at length, including no diving, no skydiving, no climbing or exposure to unprotected heights, no unsupervised swimming, no Jacuzzi or bathing in bathtubs or deep bodies of water. The patient/family have been advised about risks for operating any machinery while suffering from seizures / syncope  / epilepsy and/or while taking antiepileptic drugs.   -The patient understands and agrees that due to the complexity of his/her diagnosis, results of any testing and further recommendations will typically be discussed/made during a face to face encounter in my office. The patient and/or family further understands it is their responsibility to keep proper follow up.     Patient/family agree with plan, as outlined.         Latrice Dorantes, MSN, APRN, FNP-C  [facility]  Office: [phone]  Fax: [phone]

## 2022-01-05 ENCOUNTER — APPOINTMENT (OUTPATIENT)
Dept: NEUROLOGY | Facility: MEDICAL CENTER | Age: 67
End: 2022-01-05
Attending: NURSE PRACTITIONER
Payer: MEDICARE

## 2022-02-03 DIAGNOSIS — R56.9 SEIZURE (HCC): ICD-10-CM

## 2022-02-03 RX ORDER — LEVETIRACETAM 500 MG/1
TABLET ORAL
Qty: 60 TABLET | Refills: 5 | Status: SHIPPED | OUTPATIENT
Start: 2022-02-03 | End: 2022-08-10

## 2022-08-15 ENCOUNTER — OFFICE VISIT (OUTPATIENT)
Dept: NEUROLOGY | Facility: MEDICAL CENTER | Age: 67
End: 2022-08-15
Attending: NURSE PRACTITIONER
Payer: MEDICARE

## 2022-08-15 VITALS
SYSTOLIC BLOOD PRESSURE: 138 MMHG | RESPIRATION RATE: 16 BRPM | HEIGHT: 73 IN | TEMPERATURE: 98.2 F | BODY MASS INDEX: 21.42 KG/M2 | OXYGEN SATURATION: 99 % | HEART RATE: 66 BPM | DIASTOLIC BLOOD PRESSURE: 76 MMHG | WEIGHT: 161.6 LBS

## 2022-08-15 DIAGNOSIS — Z02.4 ENCOUNTER FOR EXAMINATION FOR DRIVING LICENSE: ICD-10-CM

## 2022-08-15 DIAGNOSIS — R56.9 SEIZURE (HCC): ICD-10-CM

## 2022-08-15 DIAGNOSIS — Z00.00 HEALTHCARE MAINTENANCE: ICD-10-CM

## 2022-08-15 DIAGNOSIS — Z13.31 SCREENING FOR DEPRESSION: ICD-10-CM

## 2022-08-15 DIAGNOSIS — E55.9 VITAMIN D DEFICIENCY: ICD-10-CM

## 2022-08-15 PROCEDURE — 99214 OFFICE O/P EST MOD 30 MIN: CPT | Performed by: NURSE PRACTITIONER

## 2022-08-15 PROCEDURE — 99211 OFF/OP EST MAY X REQ PHY/QHP: CPT | Performed by: NURSE PRACTITIONER

## 2022-08-15 RX ORDER — LEVETIRACETAM 500 MG/1
500 TABLET ORAL 2 TIMES DAILY
Qty: 180 TABLET | Refills: 1 | Status: SHIPPED | OUTPATIENT
Start: 2022-08-15 | End: 2023-03-21 | Stop reason: SDUPTHER

## 2022-08-15 ASSESSMENT — PATIENT HEALTH QUESTIONNAIRE - PHQ9: CLINICAL INTERPRETATION OF PHQ2 SCORE: 0

## 2022-08-15 NOTE — PROGRESS NOTES
Chief Complaint   Patient presents with    Seizure       Problem List Items Addressed This Visit       Seizure (HCC)    Relevant Medications    levETIRAcetam (KEPPRA) 500 MG Tab    Other Relevant Orders    CBC WITH DIFFERENTIAL    Comp Metabolic Panel    KEPPRA     Other Visit Diagnoses       Screening for depression        Encounter for examination for driving license        Vitamin D deficiency        Relevant Orders    VITAMIN D,25 HYDROXY            Interim History:  Pedro Piper 67 y.o. male presents today for seizure f/u.     He remains seizure free on keppra 500mg BID. No side effects. Mood is good. No SI or HI. He is taking vit D. He is driving with no issues but needs yearly DMV paperwork. He wants to know if he can be weaned off. Not drinking alcohol anymore. He is trying to quit smoking. Uses marijuana occasionally. No other issues.         Past medical history:   Past Medical History:   Diagnosis Date    Seizure (HCC)        Past surgical history:   Past Surgical History:   Procedure Laterality Date    ORIF, SHOULDER Right 12/10/2020    Procedure: ORIF, SHOULDER - SHOULDER DISLOCATION, GREATER TUBEROSITY;  Surgeon: Boston Marquez M.D.;  Location: SURGERY [facility];  Service: Orthopedics    ELBOWPLASTY Right 1987       Family history:   No family history on file.    Social history:   Social History     Socioeconomic History    Marital status: Single     Spouse name: Not on file    Number of children: Not on file    Years of education: Not on file    Highest education level: Not on file   Occupational History    Not on file   Tobacco Use    Smoking status: Every Day     Packs/day: 0.25     Years: 30.00     Pack years: 7.50     Types: Cigarettes    Smokeless tobacco: Never    Tobacco comments:     cutting back slowly.    Vaping Use    Vaping Use: Never used   Substance and Sexual Activity    Alcohol use: Never    Drug use: Yes     Types: Marijuana, Inhaled     Comment: marijuana- last use  "12/6/20    Sexual activity: Not on file   Other Topics Concern    Not on file   Social History Narrative    Not on file     Social Determinants of Health     Financial Resource Strain: Not on file   Food Insecurity: Not on file   Transportation Needs: Not on file   Physical Activity: Not on file   Stress: Not on file   Social Connections: Not on file   Intimate Partner Violence: Not on file   Housing Stability: Not on file       Current medications:   Current Outpatient Medications   Medication    levETIRAcetam (KEPPRA) 500 MG Tab    therapeutic multivitamin-minerals (THERAGRAN-M) Tab     No current facility-administered medications for this visit.       Medication Allergy:  No Known Allergies      Review of systems:     General: Denies fevers or chills, or nightsweats, or generalized fatigue.    Head: Denies headaches or dizziness or lightheadedness  Musculoskeletal: Denies muscle pain or swelling, no atrophy, no neck and back pain or stiffness.   Neurologic: Denies facial droopiness, muscle weakness (focal or generalized), paresthesias, ataxia, change in speech or language, memory loss, abnormal movements. Hx of seizures  Psychiatric: Denies anxiety, depression, mood swings, suicidal or homicidal thoughts       Physical examination:   Vitals:    08/15/22 0834   BP: 138/76   BP Location: Right arm   Patient Position: Sitting   BP Cuff Size: Adult   Pulse: 66   Resp: 16   Temp: 36.8 °C (98.2 °F)   TempSrc: Temporal   SpO2: 99%   Weight: 73.3 kg (161 lb 9.6 oz)   Height: 1.854 m (6' 1\")     General: Patient in no acute distress, pleasant and cooperative.  HEENT: Normocephalic, no signs of acute trauma.   Neck: Supple. There is normal range of motion.    Skin: no signs of acute rashes or trauma.   Musculoskeletal: joints exhibit full range of motion  Psychiatric: No hallucinatory behavior. No symptoms of depression or suicidal ideation. Mood and affect appear normal on exam.     NEUROLOGICAL EXAM:   Mental status, " orientation: Awake, alert and fully oriented.   Speech and language: speech is clear and fluent. The patient is able to name, repeat and comprehend.   Memory: There is intact recollection of recent and remote events.   Motor exam: Strength is 5/5 in all extremities. Tone is normal. No abnormal movements were seen on exam.   Sensory exam reveals normal sense of light touch in all extremities.    Gait: The patient was able to get up from seated position on first attempt without requiring assistance. Found to be steady when walking. Movements were fluid with normal arm swing.       ANCILLARY DATA REVIEWED:       Lab Data Review:  Reviewed in chart.       Records reviewed:   Reviewed in chart.    Imaging:   CT head 1/29/2018  Normal CT head w/o contrast     MRI brain w/o 11/26/2016  No acute intracranial pathology is seen to explain pt's symptoms  Mild chronic periventricular white matter microischemic changes  Tiny hemosiderin deposit in the left posterior temporal lobe    EEG:  N/a        ASSESSMENT AND PLAN:  1. Seizure (HCC)  - levETIRAcetam (KEPPRA) 500 MG Tab; Take 1 Tablet by mouth 2 times a day for 180 days.  Dispense: 180 Tablet; Refill: 1  - CBC WITH DIFFERENTIAL; Future  - Comp Metabolic Panel; Future  - KEPPRA; Future    2. Screening for depression    3. Encounter for examination for driving license    4. 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 Pt had initial episode of head bobbing then passing out in 2016 then a year later, he had a similar spell. No post-ictal confusion or disorientation. No tongue biting or incontinence. He was put on dilantin 100mg, 2 tabs BID by prior neurologist. No records to review. He attributes his spells to bad diet as he was eating junk foods and not eating regularly then. No auras or triggers to his spells that he knows off. His CT head was unremarkable. His MRI brain w/o showed tiny hemosiderin  deposit in the left posterior temporal lobe - no image to review. Last seizure/spell was on 1/29/2018. He remains spell free and he continued taking his keppra. He is still wanting to be weaned off in the future.      No family hx of epilepsy. No TBI hx      No alcohol use.  Smokes cigarettes and is planning to quit.  He uses marijuana occasionally     Mood is good. No suicidal or homicidal thoughts.     Past ASM's: dilantin     Current ASM's: Keppra 500mg BID.        Plan:  -continue keppra. We have discussed doing updated work-up including MRI brain and EEG if he really wants to challenge the diagnosis. He wants to wait for now.      - Discussed avoidance of spell/sz triggers: alcohol, sleep deprivation, and stress.     - Discussed Vit D supplementation. Recommended taking 2000-5000u daily.     - Discussed driving restrictions. Okay to drive but aware to stop driving immediately if a spell occurs and report to us. DMV paperwork faxed and copy given to pt.      -Labs to be checked for next appointment: CBC, CMP, Vit D, keppra    -Aware that I will no longer be with [facility] after Sept 2022        FOLLOW-UP:   Return in about 6 months (around 2/15/2023).      EDUCATION AND COUNSELING:  -Education was provided to the patient and/or family regarding diagnosis and prognosis. The chronic and unpredictable nature of the condition were discussed. There is increased risk for additional events, which may carry potential for significant injuries and death. Discussed frequent seizure triggers: sleep deprivation, medication non-compliance, use of illegal drugs/alcohol, stress, and others.   - There are potential side effects of antiepileptics including but no limited to: hypersensitivity reactions (rash and others, some of which can be fatal), visual field changes (some of which may be irreversible), glaucoma, diplopia, kidney stones, osteopenia/osteoporosis/bone fractures, hyperthermia/anhydrosis, hyponatremia, tremors/abnormal  movements, ataxia, dizziness, fatigue, increased risk for falls, risk for cardiac arrhythmias/syncope, gastrointestinal side effects(hepatitis, pancreatitis, gastritis, ulcers), gingival hypertrophy/bleeding, drowsiness, sedation, anxiety/nervousness, increased risk for suicide, increased risk for depression, and psychosis.   -Recommend chronic vitamin D supplementation and regular exercise (if not contraindicated).   -Patient/family educated on risk for SUDEP (Sudden Death in Epilepsy). Counseling was provided on the importance of strict medication and follow up compliance. The patient/family understand the risks associated with non-adherence with the medical plan as outlined, including but not limited to an increased risk for breakthrough seizures, which may contribute to injuries, disability, status epilepticus, and even death.   -There are potential effects of alcohol and other drugs, which may lower seizure threshold and/or affect the metabolism of antiepileptic drugs. We recommend avoidance of alcohol and illegal drugs.  -Avoid sleep deprivation.   -The patient is encourage to report to the Division of Motor Vehicles of any condition and/or spells related to confusion, disorientation, and/or loss of awareness and/or loss of consciousness; as these may pose a safety issue if they occur while operating a motor vehicle. The patient and/or family are ultimately responsible for exercising caution and abiding to regulations in place.   -Other seizure precautions were discussed at length, including no diving, no skydiving, no climbing or exposure to unprotected heights, no unsupervised swimming, no Jacuzzi or bathing in bathtubs or deep bodies of water. There are risks for operating any machinery while suffering from seizures / syncope / epilepsy and/or while taking antiepileptic drugs.   -The patient understands and agrees that due to the complexity of his/her diagnosis, results of any testing and further  recommendations will typically be discussed/made during a face to face encounter in my office. The patient and/or family further understands it is their responsibility to keep proper follow up.     Patient agrees with plan, as outlined.         Latrice Dorantes MSN, APRN, FNP-C  [facility]  Office: [phone]  Fax: [phone]

## 2022-09-14 ENCOUNTER — OFFICE VISIT (OUTPATIENT)
Dept: MEDICAL GROUP | Facility: PHYSICIAN GROUP | Age: 67
End: 2022-09-14
Attending: NURSE PRACTITIONER
Payer: MEDICARE

## 2022-09-14 VITALS
HEIGHT: 73 IN | HEART RATE: 84 BPM | OXYGEN SATURATION: 100 % | DIASTOLIC BLOOD PRESSURE: 84 MMHG | WEIGHT: 159 LBS | TEMPERATURE: 98.5 F | SYSTOLIC BLOOD PRESSURE: 138 MMHG | BODY MASS INDEX: 21.07 KG/M2 | RESPIRATION RATE: 16 BRPM

## 2022-09-14 DIAGNOSIS — R56.9 SEIZURE (HCC): ICD-10-CM

## 2022-09-14 DIAGNOSIS — Z13.6 SCREENING FOR CARDIOVASCULAR CONDITION: ICD-10-CM

## 2022-09-14 DIAGNOSIS — Z12.5 SCREENING FOR MALIGNANT NEOPLASM OF PROSTATE: ICD-10-CM

## 2022-09-14 DIAGNOSIS — Z13.6 ENCOUNTER FOR ABDOMINAL AORTIC ANEURYSM (AAA) SCREENING: ICD-10-CM

## 2022-09-14 DIAGNOSIS — Z12.11 COLON CANCER SCREENING: ICD-10-CM

## 2022-09-14 DIAGNOSIS — Z72.0 TOBACCO USE: ICD-10-CM

## 2022-09-14 PROCEDURE — 99214 OFFICE O/P EST MOD 30 MIN: CPT | Mod: 25 | Performed by: INTERNAL MEDICINE

## 2022-09-14 PROCEDURE — 99406 BEHAV CHNG SMOKING 3-10 MIN: CPT | Performed by: INTERNAL MEDICINE

## 2022-09-14 NOTE — ASSESSMENT & PLAN NOTE
This is a chronic condition for the patient was seen by neurologist recently.  The patient presently taking Keppra.  Patient reported that no seizure activity noted since last 4 years  No significant side effects reported.

## 2022-09-14 NOTE — ASSESSMENT & PLAN NOTE
I spent 4 minutes of face to face counseling pt regarding tobacco cessation.   Discussed w pt the potential risks/hazards of tobacco.    Strongly advised pt to quit.  Continue to work on reducing. Attempt 50% reduction every 2-3 weeks.

## 2022-09-14 NOTE — PROGRESS NOTES
"PRIMARY CARE CLINIC VISIT  Chief Complaint   Patient presents with    New Patient         History of Present Illness     Seizure (HCC)  This is a chronic condition for the patient was seen by neurologist recently.  The patient presently taking Keppra.  Patient reported that no seizure activity noted since last 4 years  No significant side effects reported.    Tobacco use  I spent 4 minutes of face to face counseling pt regarding tobacco cessation.   Discussed w pt the potential risks/hazards of tobacco.    Strongly advised pt to quit.  Continue to work on reducing. Attempt 50% reduction every 2-3 weeks.    Current Outpatient Medications on File Prior to Visit   Medication Sig Dispense Refill    levETIRAcetam (KEPPRA) 500 MG Tab Take 1 Tablet by mouth 2 times a day for 180 days. 180 Tablet 1    therapeutic multivitamin-minerals (THERAGRAN-M) Tab Take 1 Tab by mouth every day.       No current facility-administered medications on file prior to visit.        Allergies: Patient has no known allergies.    ROS  As per HPI above. All other systems reviewed and negative.      Past Medical, Social, and Family history reviewed and updated in EPIC     Objective     /84 (BP Location: Left arm, Patient Position: Sitting, BP Cuff Size: Adult)   Pulse 84   Temp 36.9 °C (98.5 °F) (Temporal)   Resp 16   Ht 1.854 m (6' 1\")   Wt 72.1 kg (159 lb)   SpO2 100%    Body mass index is 20.98 kg/m².    General: alert in no apparent distress.  Cardiovascular: regular rate and rhythm  Pulmonary: lungs : no wheezing   Gastrointestinal: BS present. No obvious mass noted  Neuro cranial nerves II to XII grossly intact        Assessment and Plan     1. Seizure (HCC)  Chronic condition.  Stable.  Recommend to continue with Keppra.  Patient is due to follow-up with neurologist and February 2023.  The patient requests update referral  - Referral to Neurology    2. Colon cancer screening  Strongly recommend colonoscopy.  The patient " declined.  He wishes to do Cologuard  - COLOGUARD (FIT DNA)    3. Encounter for abdominal aortic aneurysm (AAA) screening  - US-ABDOMINAL AORTA W/O DOPPLER; Future    4. Tobacco use  Strongly advised the patient to quit smoking.    Routine lab work ordered.  Advised the patient to follow-up few days after lab test done      Follow-up 6 months              Healthcare Maintenance       Patient declined routine immunizations.  The patient will go to the local pharmacy          Please note that this dictation was created using voice recognition software. I have made every reasonable attempt to correct obvious errors, but I expect that there are errors of grammar and possibly content that I did not discover before finalizing the note.    Jaswant Dillon MD  Internal Medicine  [facility]

## 2022-10-25 ENCOUNTER — APPOINTMENT (OUTPATIENT)
Dept: RADIOLOGY | Facility: MEDICAL CENTER | Age: 67
End: 2022-10-25
Attending: INTERNAL MEDICINE
Payer: MEDICARE

## 2023-03-21 DIAGNOSIS — R56.9 SEIZURE (HCC): ICD-10-CM

## 2023-03-21 RX ORDER — LEVETIRACETAM 500 MG/1
500 TABLET ORAL 2 TIMES DAILY
Qty: 180 TABLET | Refills: 0 | Status: SHIPPED | OUTPATIENT
Start: 2023-03-21 | End: 2023-04-13

## 2023-04-13 ENCOUNTER — OFFICE VISIT (OUTPATIENT)
Dept: MEDICAL GROUP | Facility: PHYSICIAN GROUP | Age: 68
End: 2023-04-13
Payer: MEDICARE

## 2023-04-13 VITALS
OXYGEN SATURATION: 97 % | DIASTOLIC BLOOD PRESSURE: 86 MMHG | RESPIRATION RATE: 16 BRPM | TEMPERATURE: 98.2 F | BODY MASS INDEX: 21.07 KG/M2 | HEART RATE: 68 BPM | HEIGHT: 73 IN | SYSTOLIC BLOOD PRESSURE: 124 MMHG | WEIGHT: 159 LBS

## 2023-04-13 DIAGNOSIS — Z13.6 SCREENING FOR CARDIOVASCULAR CONDITION: ICD-10-CM

## 2023-04-13 DIAGNOSIS — R53.82 CHRONIC FATIGUE: ICD-10-CM

## 2023-04-13 DIAGNOSIS — Z11.59 NEED FOR HEPATITIS C SCREENING TEST: ICD-10-CM

## 2023-04-13 DIAGNOSIS — Z72.0 TOBACCO USE: ICD-10-CM

## 2023-04-13 DIAGNOSIS — R56.9 SEIZURE (HCC): ICD-10-CM

## 2023-04-13 DIAGNOSIS — E55.9 VITAMIN D DEFICIENCY: ICD-10-CM

## 2023-04-13 DIAGNOSIS — Z13.6 ENCOUNTER FOR ABDOMINAL AORTIC ANEURYSM (AAA) SCREENING: ICD-10-CM

## 2023-04-13 DIAGNOSIS — Z12.5 SCREENING FOR MALIGNANT NEOPLASM OF PROSTATE: ICD-10-CM

## 2023-04-13 PROCEDURE — G0439 PPPS, SUBSEQ VISIT: HCPCS | Performed by: INTERNAL MEDICINE

## 2023-04-13 RX ORDER — LEVETIRACETAM 500 MG/1
500 TABLET ORAL 2 TIMES DAILY
Qty: 180 TABLET | Refills: 3 | Status: SHIPPED | OUTPATIENT
Start: 2023-04-13 | End: 2023-10-10

## 2023-04-13 ASSESSMENT — ACTIVITIES OF DAILY LIVING (ADL): BATHING_REQUIRES_ASSISTANCE: 0

## 2023-04-13 ASSESSMENT — ENCOUNTER SYMPTOMS: GENERAL WELL-BEING: FAIR

## 2023-04-13 ASSESSMENT — PATIENT HEALTH QUESTIONNAIRE - PHQ9: CLINICAL INTERPRETATION OF PHQ2 SCORE: 0

## 2023-04-13 NOTE — ASSESSMENT & PLAN NOTE
This is a chronic and stable condition.  The patient has been taking Keppra 500 mg twice daily.  The patient has not had any seizure activity for several years.  Patient tolerating medication well no significant side effects reported.  Lab test requested to check Keppra level

## 2023-04-13 NOTE — PROGRESS NOTES
Chief Complaint   Patient presents with    Annual Wellness Visit       HPI:  Pedro is a 67 y.o. here for Medicare Annual Wellness Visit        Patient Active Problem List    Diagnosis Date Noted    Seizure (HCC) 07/21/2020    Tobacco use 07/21/2020       Current Outpatient Medications   Medication Sig Dispense Refill    levETIRAcetam (KEPPRA) 500 MG Tab Take 1 Tablet by mouth 2 times a day for 180 days. 180 Tablet 3    therapeutic multivitamin-minerals (THERAGRAN-M) Tab Take 1 Tab by mouth every day.       No current facility-administered medications for this visit.        Patient is taking medications as noted in medication list.  Current supplements as per medication list.     Allergies: Patient has no known allergies.    Current social contact/activities: no     Is patient current with immunizations? No, due for PNEUMOVAX (PPSV23), TDAP, and SHINGRIX (Shingles). Patient is interested in receiving NONE today.    He  reports that he has been smoking cigarettes. He has never used smokeless tobacco. He reports current drug use. Drugs: Marijuana and Inhaled. He reports that he does not drink alcohol.  Ready to quit: Not Answered  Counseling given: Not Answered  Tobacco comments: cutting back slowly.       ROS:    Gait: Uses no assistive device   Ostomy: No   Other tubes: No   Amputations: No   Chronic oxygen use No   Last eye exam years ago   Wears hearing aids: No   : Denies any urinary leakage during the last 6 months    Screening:    Depression Screening  Little interest or pleasure in doing things?  0 - not at all  Feeling down, depressed, or hopeless? 0 - not at all  Patient Health Questionnaire Score: 0    If depressive symptoms identified deferred to follow up visit unless specifically addressed in assessment and plan.    Interpretation of PHQ-9 Total Score   Score Severity   1-4 No Depression   5-9 Mild Depression   10-14 Moderate Depression   15-19 Moderately Severe Depression   20-27 Severe  Depression    Screening for Cognitive Impairment  Three Minute Recall (daughter, heaven, mountain)  2/3    Ric clock face with all 12 numbers and set the hands to show 10 past 11.  Yes 5/5  If cognitive concerns identified, deferred for follow up unless specifically addressed in assessment and plan.    Fall Risk Assessment  Has the patient had two or more falls in the last year or any fall with injury in the last year?  No  If fall risk identified, deferred for follow up unless specifically addressed in assessment and plan.    Safety Assessment  Throw rugs on floor.  Yes  Handrails on all stairs.  Yes  Good lighting in all hallways.  Yes  Difficulty hearing.  No  Patient counseled about all safety risks that were identified.    Functional Assessment ADLs  Are there any barriers preventing you from cooking for yourself or meeting nutritional needs?  No.    Are there any barriers preventing you from driving safely or obtaining transportation?  No.    Are there any barriers preventing you from using a telephone or calling for help?  No.    Are there any barriers preventing you from shopping?  No.    Are there any barriers preventing you from taking care of your own finances?  No.    Are there any barriers preventing you from managing your medications?  No.    Are there any barriers preventing you from showering, bathing or dressing yourself?  No.    Are you currently engaging in any exercise or physical activity?  Yes.     What is your perception of your health?  Fair.    Advance Care Planning  Do you have an Advance Directive, Living Will, Durable Power of , or POLST? No               Health Maintenance Summary            Overdue - IMM PNEUMOCOCCAL VACCINE: 65+ Years (1 - PCV) Overdue - never done      No completion history exists for this topic.              Overdue - IMM DTaP/Tdap/Td Vaccine (1 - Tdap) Overdue - never done      No completion history exists for this topic.              Overdue - IMM ZOSTER  VACCINES (1 of 2) Overdue - never done      No completion history exists for this topic.              Ordered - ABDOMINAL AORTIC ANEURYSM (AAA) SCREEN (Once) Ordered on 4/13/2023      No completion history exists for this topic.              Postponed - COVID-19 Vaccine (1) Postponed until 4/13/2024      No completion history exists for this topic.              IMM INFLUENZA (Season Ended) Next due on 9/1/2023      No completion history exists for this topic.              Annual Wellness Visit (Every 366 Days) Next due on 4/13/2024 04/13/2023  Level of Service: ANNUAL WELLNESS VISIT-INCLUDES PPPS SUBSEQUE*              COLORECTAL CANCER SCREENING (COLOGUARD STOOL DNA - Every 3 Years) Next due on 9/30/2025 09/30/2022  COLOGUARD COLON CANCER SCREENING              IMM MENINGOCOCCAL ACWY VACCINE (Series Information) Aged Out      No completion history exists for this topic.              Discontinued - IMM HEP B VACCINE  Discontinued      No completion history exists for this topic.              Discontinued - HEPATITIS C SCREENING  Ordered on 4/13/2023      No completion history exists for this topic.                    Patient Care Team:  aJswant Dillon M.D. as PCP - General (Internal Medicine)    Social History     Tobacco Use    Smoking status: Some Days     Years: 30.00     Types: Cigarettes    Smokeless tobacco: Never    Tobacco comments:     cutting back slowly.    Vaping Use    Vaping Use: Never used   Substance Use Topics    Alcohol use: Never    Drug use: Yes     Types: Marijuana, Inhaled     Comment: marijuana- last use 12/6/20     Family History   Problem Relation Age of Onset    No Known Problems Father     No Known Problems Sister      He  has a past medical history of Seizure (HCC).   Past Surgical History:   Procedure Laterality Date    ORIF, SHOULDER Right 12/10/2020    Procedure: ORIF, SHOULDER - SHOULDER DISLOCATION, GREATER TUBEROSITY;  Surgeon: Boston Marquez M.D.;  Location: SURGERY  "[address];  Service: Orthopedics    ELBOWPLASTY Right 1987    SHOULDER ARTHROSCOPY Right        Exam:   /86 (BP Location: Right arm, Patient Position: Sitting, BP Cuff Size: Adult)   Pulse 68   Temp 36.8 °C (98.2 °F) (Temporal)   Resp 16   Ht 1.854 m (6' 1\")   Wt 72.1 kg (159 lb)   SpO2 97%  Body mass index is 20.98 kg/m².    Hearing fair.    Dentition fair  Alert, oriented in no acute distress.  Eye contact is good, speech goal directed, affect calm      Assessment and Plan. The following treatment and monitoring plan is recommended:      Seizure (HCC)  This is a chronic and stable condition.  The patient has been taking Keppra 500 mg twice daily.  The patient has not had any seizure activity for several years.  Patient tolerating medication well no significant side effects reported.  Lab test requested to check Keppra level    Tobacco use  Chronic condition.     Attestation: I spent 5 minutes of face to face counseling pt regarding nicotine cessation.   Discussed w pt and counseling on harmful effects of nicotine.     Strongly advised pt to quit.       Need for vaccinations  Patient is due for pneumococcal vaccine Tdap and shingle vaccine.  The patient would like to go local pharmacy for these vaccinations.        Health Care Screening recommendations as per orders if indicated.  Referrals offered: PT/OT/Nutrition counseling/Behavioral Health/Smoking cessation as per orders if indicated.    Discussion today about general wellness and lifestyle habits:    Prevent falls and reduce trip hazards; Cautioned about securing or removing rugs.  Have a working fire alarm and carbon monoxide detector;   Engage in regular physical activity and social activities     Follow-up: Return in about 6 months (around 10/13/2023).  "

## 2023-04-13 NOTE — ASSESSMENT & PLAN NOTE
Chronic condition.     Attestation: I spent 5 minutes of face to face counseling pt regarding nicotine cessation.   Discussed w pt and counseling on harmful effects of nicotine.     Strongly advised pt to quit.

## 2023-05-11 ENCOUNTER — HOSPITAL ENCOUNTER (OUTPATIENT)
Dept: RADIOLOGY | Facility: MEDICAL CENTER | Age: 68
End: 2023-05-11
Attending: INTERNAL MEDICINE
Payer: MEDICARE

## 2023-05-11 DIAGNOSIS — Z13.6 ENCOUNTER FOR ABDOMINAL AORTIC ANEURYSM (AAA) SCREENING: ICD-10-CM

## 2023-05-11 PROCEDURE — 76775 US EXAM ABDO BACK WALL LIM: CPT

## 2023-08-31 ENCOUNTER — OFFICE VISIT (OUTPATIENT)
Dept: MEDICAL GROUP | Facility: PHYSICIAN GROUP | Age: 68
End: 2023-08-31
Payer: MEDICARE

## 2023-08-31 VITALS
BODY MASS INDEX: 20.86 KG/M2 | TEMPERATURE: 98.2 F | HEIGHT: 72 IN | DIASTOLIC BLOOD PRESSURE: 80 MMHG | SYSTOLIC BLOOD PRESSURE: 130 MMHG | HEART RATE: 72 BPM | WEIGHT: 154 LBS | RESPIRATION RATE: 20 BRPM | OXYGEN SATURATION: 98 %

## 2023-08-31 DIAGNOSIS — Z23 NEED FOR VACCINATION: ICD-10-CM

## 2023-08-31 DIAGNOSIS — E55.9 VITAMIN D DEFICIENCY: ICD-10-CM

## 2023-08-31 DIAGNOSIS — Z72.0 TOBACCO USE: ICD-10-CM

## 2023-08-31 DIAGNOSIS — E78.5 DYSLIPIDEMIA: ICD-10-CM

## 2023-08-31 DIAGNOSIS — R56.9 SEIZURE (HCC): ICD-10-CM

## 2023-08-31 DIAGNOSIS — R53.82 CHRONIC FATIGUE: ICD-10-CM

## 2023-08-31 DIAGNOSIS — Z02.9 ADMINISTRATIVE ENCOUNTER: ICD-10-CM

## 2023-08-31 DIAGNOSIS — Z12.5 SCREENING FOR MALIGNANT NEOPLASM OF PROSTATE: ICD-10-CM

## 2023-08-31 PROCEDURE — 3079F DIAST BP 80-89 MM HG: CPT | Performed by: INTERNAL MEDICINE

## 2023-08-31 PROCEDURE — 3075F SYST BP GE 130 - 139MM HG: CPT | Performed by: INTERNAL MEDICINE

## 2023-08-31 PROCEDURE — 99214 OFFICE O/P EST MOD 30 MIN: CPT | Performed by: INTERNAL MEDICINE

## 2023-09-01 NOTE — ASSESSMENT & PLAN NOTE
This is a chronic condition.  The patient is currently taking Keppra 500 mg twice daily.  Patient has not had any seizure activity since 2019.  Patient tolerating medication well.  Patient stated that he was released by neurologist.  Patient is due for lab test to check Keppra level..

## 2023-09-01 NOTE — ASSESSMENT & PLAN NOTE
Chronic condition.  The patient stated that he has stopped smoking since the last 4 months.  Patient was congratulated.  Patient denies shortness of breath wheezing or any other symptom.

## 2023-09-01 NOTE — PROGRESS NOTES
PRIMARY CARE CLINIC VISIT    Chief complaint:    Complete DMV form  Vaccination status  Seizure  Tobacco use  Request lab test      History of Present Illness     Need for vaccination  Pt is due for shingle vaccine, TDAP and pneumovax    Seizure (HCC)  This is a chronic condition.  The patient is currently taking Keppra 500 mg twice daily.  Patient has not had any seizure activity since 2019.  Patient tolerating medication well.  Patient stated that he was released by neurologist.  Patient is due for lab test to check Keppra level..    Tobacco use  Chronic condition.  The patient stated that he has stopped smoking since the last 4 months.  Patient was congratulated.  Patient denies shortness of breath wheezing or any other symptom.    Administrative encounter  Patient requested DMV form to be completed    Current Outpatient Medications on File Prior to Visit   Medication Sig Dispense Refill    levETIRAcetam (KEPPRA) 500 MG Tab Take 1 Tablet by mouth 2 times a day for 180 days. 180 Tablet 3    therapeutic multivitamin-minerals (THERAGRAN-M) Tab Take 1 Tab by mouth every day.       No current facility-administered medications on file prior to visit.        Allergies: Patient has no known allergies.    Current Outpatient Medications Ordered in Epic   Medication Sig Dispense Refill    levETIRAcetam (KEPPRA) 500 MG Tab Take 1 Tablet by mouth 2 times a day for 180 days. 180 Tablet 3    therapeutic multivitamin-minerals (THERAGRAN-M) Tab Take 1 Tab by mouth every day.       No current [facility]-ordered facility-administered medications on file.       Past Medical History:   Diagnosis Date    Seizure (HCC)        Past Surgical History:   Procedure Laterality Date    ORIF, SHOULDER Right 12/10/2020    Procedure: ORIF, SHOULDER - SHOULDER DISLOCATION, GREATER TUBEROSITY;  Surgeon: Boston Marquez M.D.;  Location: SURGERY [facility];  Service: Orthopedics    ELBOWPLASTY Right 1987    SHOULDER ARTHROSCOPY Right        Family  History   Problem Relation Age of Onset    No Known Problems Father     No Known Problems Sister        Social History     Tobacco Use   Smoking Status Some Days    Types: Cigarettes   Smokeless Tobacco Never   Tobacco Comments    cutting back slowly.        Social History     Substance and Sexual Activity   Alcohol Use Never       Review of systems.  As per HPI above. All other systems reviewed and negative.      Past Medical, Social, and Family history reviewed and updated in EPIC     Objective     /80   Pulse 72   Temp 36.8 °C (98.2 °F) (Temporal)   Resp 20   Ht 1.829 m (6')   Wt 69.9 kg (154 lb)   SpO2 98%    Body mass index is 20.89 kg/m².    General: alert in no apparent distress.  Cardiovascular: regular rate and rhythm  Pulmonary: lungs : no wheezing   Gastrointestinal: BS present. No obvious mass noted  Cranial nerves II to XII grossly intact mental status and speech appropriate gait no ataxia noted      Assessment and Plan     1. Need for vaccination  Reminded patient to go to local pharmacy for shingle vaccine Tdap and pneumonia vaccination.    2. Seizure (HCC)  - LEVETIRACETAM (KEPPRA), S  Chronic stable condition.  No seizure activity since 2019.  Continue with Keppra 500 mg p.o. twice daily.  Keppra lab test ordered.  Continue to monitor.    3. Tobacco use  Chronic condition.  As above the patient has stopped smoking since last 4 months.  Patient congratulated.  Continue to monitor.    4. Administrative encounter  DMV form completed today for the patient    Attestation: I spent:  34   min -  That includes time to fill out DMV form.  Time for chart review before the visit, the actual patient visit, and time spent on documentation in EMR after the visit.  Chart review/prep, review of other providers' records, imaging/lab review, face-to-face time for history/examination, pt's counseling/education, ordering, prescribing,  review of results/meds/ treatment plan with patient, and care  coordination.                         Please note that this dictation was created using voice recognition software. I have made every reasonable attempt to correct obvious errors, but I expect that there are errors of grammar and possibly content that I did not discover before finalizing the note.    Jaswant Dillon MD  Internal Medicine  [facility]

## 2024-04-22 DIAGNOSIS — R56.9 SEIZURE (HCC): ICD-10-CM

## 2024-04-23 DIAGNOSIS — R56.9 SEIZURE (HCC): ICD-10-CM

## 2024-04-23 RX ORDER — LEVETIRACETAM 500 MG/1
500 TABLET ORAL 2 TIMES DAILY
Qty: 180 TABLET | Refills: 0 | Status: SHIPPED | OUTPATIENT
Start: 2024-04-23

## 2024-07-19 DIAGNOSIS — R56.9 SEIZURE (HCC): ICD-10-CM

## 2024-07-19 RX ORDER — LEVETIRACETAM 500 MG/1
500 TABLET ORAL 2 TIMES DAILY
Qty: 120 TABLET | Refills: 0 | Status: SHIPPED | OUTPATIENT
Start: 2024-07-19

## 2024-09-14 DIAGNOSIS — R56.9 SEIZURE (HCC): ICD-10-CM

## 2024-09-16 RX ORDER — LEVETIRACETAM 500 MG/1
500 TABLET ORAL 2 TIMES DAILY
Qty: 1209 TABLET | Refills: 0 | Status: SHIPPED | OUTPATIENT
Start: 2024-09-16

## 2024-09-16 NOTE — TELEPHONE ENCOUNTER
Received request via: Pharmacy    Was the patient seen in the last year in this department? No    Does the patient have an active prescription (recently filled or refills available) for medication(s) requested? No    Pharmacy Name:     [id number] - [place], [place] - [address] AT [facility] [address] (Pharmacy) [phone]       Does the patient have half-way Plus and need 100-day supply? (This applies to ALL medications) Patient does not have SCP

## (undated) DEVICE — DRAPE SURGICAL U 77X120 - (10/CA)

## (undated) DEVICE — K WIRE

## (undated) DEVICE — FIBERWIRE 5.0 - 12/BX ORDER IN MULTIPLES OF 12

## (undated) DEVICE — SENSOR SPO2 NEO LNCS ADHESIVE (20/BX) SEE USER NOTES

## (undated) DEVICE — SODIUM CHL IRRIGATION 0.9% 1000ML (12EA/CA)

## (undated) DEVICE — GOWN WARMING STANDARD FLEX - (30/CA)

## (undated) DEVICE — TUBING CLEARLINK DUO-VENT - C-FLO (48EA/CA)

## (undated) DEVICE — Device

## (undated) DEVICE — CANISTER SUCTION 3000ML MECHANICAL FILTER AUTO SHUTOFF MEDI-VAC NONSTERILE LF DISP  (40EA/CA)

## (undated) DEVICE — BLADE SURGICAL #10 - (50/BX)

## (undated) DEVICE — ELECTRODE DUAL RETURN W/ CORD - (50/PK)

## (undated) DEVICE — SUCTION INSTRUMENT YANKAUER BULBOUS TIP W/O VENT (50EA/CA)

## (undated) DEVICE — NEPTUNE 4 PORT MANIFOLD - (20/PK)

## (undated) DEVICE — MASK ANESTHESIA ADULT  - (100/CA)

## (undated) DEVICE — PROTECTOR ULNA NERVE - (36PR/CA)

## (undated) DEVICE — HEAD HOLDER JUNIOR/ADULT

## (undated) DEVICE — KIT ANESTHESIA W/CIRCUIT & 3/LT BAG W/FILTER (20EA/CA)

## (undated) DEVICE — DRAPE U ORTHOPEDIC - (10/BX)

## (undated) DEVICE — SET EXTENSION WITH 2 PORTS (48EA/CA) ***PART #2C8610 IS A SUBSTITUTE*****

## (undated) DEVICE — TIP INTPLS HFLO ML ORFC BTRY - (12/CS)  FOR SURGILAV

## (undated) DEVICE — ELECTRODE 850 FOAM ADHESIVE - HYDROGEL RADIOTRNSPRNT (50/PK)

## (undated) DEVICE — CHLORAPREP 26 ML APPLICATOR - ORANGE TINT(25/CA)

## (undated) DEVICE — GLOVE BIOGEL PI INDICATOR SZ 7.0 SURGICAL PF LF - (50/BX 4BX/CA)

## (undated) DEVICE — SLEEVE, VASO, THIGH, MED

## (undated) DEVICE — SUTURE 0 VICRYL PLUS CT-1 - 36 INCH (36/BX)

## (undated) DEVICE — LACTATED RINGERS INJ 1000 ML - (14EA/CA 60CA/PF)

## (undated) DEVICE — SUTURE 2-0 VICRYL PLUS CT-1 36 (36PK/BX)"

## (undated) DEVICE — SET LEADWIRE 5 LEAD BEDSIDE DISPOSABLE ECG (1SET OF 5/EA)

## (undated) DEVICE — PACK MAJOR ORTHO - (2EA/CA)

## (undated) DEVICE — SUTURE GENERAL

## (undated) DEVICE — GLOVE SZ 7 BIOGEL PI MICRO - PF LF (50PR/BX 4BX/CA)